# Patient Record
Sex: FEMALE | Race: WHITE | HISPANIC OR LATINO | Employment: UNEMPLOYED | URBAN - METROPOLITAN AREA
[De-identification: names, ages, dates, MRNs, and addresses within clinical notes are randomized per-mention and may not be internally consistent; named-entity substitution may affect disease eponyms.]

---

## 2018-01-11 NOTE — PROGRESS NOTES
SEP 6 2016         RE: Carmen Rockwell                               To: Bong Mcgarry LORENA NATA    MR#: [id number]   : [address]: [id number]                             Fax: [phone]   (Exam #: [id number])      The LMP of this 32year old,  G1, P0-0-0-0 patient was DEC 24 2015, giving   her an TAE of SEP 29 2016 and a current gestational age of 42 weeks 5 days   by dates  A sonographic examination was performed on SEP 6 2016 using real   time equipment  The ultrasound examination was performed using abdominal   technique  The patient has a BMI of 22 3  Her blood pressure today was   99/55  Earliest ultrasound found in her record: [facility] 16 35w2d 10/9/16 TAE      Thank you very much for your kind referral of Carmen Rockwell to the   [facility]  in [place] on 2016 for ultrasound   evaluation and  consult  Rebecca Mcdaniel is a 26-year-old primigravida   who is currently at 36-5/7 weeks gestation by an estimated due date of   2016  She recently moved to the [place] from [facility]  I offered Rebecca Mcdaniel the services of the telephone  service for   translation from [facility], which she declined  Instead, she preferred to   have a family member, her mother-in-law, provides translation  Rebecca Mcdaniel has   no physical complaints today  She reports regular fetal movement and   denies problems related to vaginal bleeding,  labor, or   hypertension  A review of medical records reveals that she had an elevated   screening one-hour Glucola value of 163 mg/dL on 2016  A   diagnostic three-hour glucose tolerance test performed on 2016   revealed 2 out of 4 abnormal values, consistent with a diagnosis of   gestational diabetes  Rebecca Mcdaniel has unremarkable past medical and surgical histories  She   currently takes no medication with the exception of a prenatal vitamin on   a daily basis   She denies tobacco, alcohol, or illicit drug use during the   pregnancy  She has no known drug allergy  The family genetic history is   negative with respect to genetic abnormalities, birth defects, or mental   retardation  Her dad has diabetes  The family medical history is otherwise   non contributory  Cardiac motion was observed at 138 bpm       INDICATIONS      late transfer   fetal anatomical survey      Exam Types      LEVEL II   Doppler study   NST      RESULTS      Fetus # 1 of 1   Vertex presentation   Probable symmetric IUGR   Placenta Location = Anterior   No placenta previa   Placenta Grade = II      The NST was reactive with no decelerations  MEASUREMENTS (* Included In Average GA)      AC              30 0 cm        34 weeks 1 day * (9%)   BPD              9 2 cm        37 weeks 4 days* (70%)   HC              32 3 cm        36 weeks 0 days* (31%)   Femur            6 6 cm        33 weeks 4 days* (7%)      Humerus          5 6 cm        32 weeks 2 days  (<5%)   Radius           4 7 cm        34 weeks 0 days   Ulna             5 3 cm        33 weeks 4 days   Tibia            5 8 cm        33 weeks 6 days  (16%)   Fibula           5 7 cm        32 weeks 6 days      Cerebellum       4 8 cm        36 weeks 1 day   Biorbit          5 2 cm        32 weeks 5 days      HC/AC           1 08   FL/AC           0 22   FL/BPD          0 71   EFW (Ac/Fl/Hc)  2404 grams - 5 lbs 5 oz                 (7%)      THE AVERAGE GESTATIONAL AGE is 35 weeks 2 days +/- 21 days        AMNIOTIC FLUID      Q1: 5 4      Q2: 3 6      Q3: 4 8      Q4: 3 1   FRANCINE Total = 16 9 cm   Amniotic Fluid: Normal      FETAL VESSELS                                     S/D   PI    RI    PSV   AEDV RF                                                    cm/s       Umbilical Artery                 0 74              No   No       Middle Cerebral Artery           1 34      ANATOMY      Head                                    See Details   Face/Neck                               See Details   Th  Cav  Normal   Heart                                   Normal   Abd  Cav  Normal   Stomach                                 Normal   Right Kidney                            Normal   Left Kidney                             Normal   Bladder                                 Normal   Abd  Wall                               Normal   Spine                                   Not Visualized   Extrems                                 See Details   Placenta                                Normal   Umbl  Cord                              Normal   Uterus                                  Normal   PCI                                     Not Visualized      ANATOMY DETAILS      Visualized Appearing Sonographically Normal:   HEAD: (Calvarium, BPD Level, Lateral Ventricles, Choroid Plexus,   Cerebellum);    TH  CAV : (Diaphragm); HEART: (Four Chamber View,   Proximal Left Outflow, Proximal Right Outflow, 3 Vessel Trachea, Short   Axis of Greater Vessels, Ductal Arch, Aortic Arch, Interventricular   Septum, Interatrial Septum, Cardiac Axis, Cardiac Position);    ABD  CAV ,   STOMACH, RIGHT KIDNEY, LEFT KIDNEY, BLADDER, ABD  WALL, EXTREMS: (Lt   Humerus, Rt Humerus, Lt Forearm, Rt Forearm, Lt Femur, Rt Femur); PLACENTA, UMBL  CORD, UTERUS      Not Visualized:   HEAD: (Cavum, Cisterna Magna);    FACE/NECK: (Neck, Profile, Orbits,   Nose/Lips, Palate, Face);    SPINE, EXTREMS: (Lt Hand, Rt Hand, Lt Low   Leg, Rt Low Leg, Lt Foot, Rt Foot); PCI      ADNEXA      The left ovary appeared normal and measured 4 7 x 3 3 x 1 4 cm with a   volume of 11 4 cc  The right ovary appeared normal and measured 2 9 x 1 3   x 1 3 cm with a volume of 2 6 cc        IMPRESSION      [facility] IUP   35 weeks and 2 days by this ultrasound  (TAE=OCT 9 2016)   Vertex presentation   Growth assessment indicated probable symmetric IUGR   Regular fetal heart rate of 138 bpm   Anterior placenta   No placenta previa      CONSULT COMMENT      No fetal structural abnormality or ultrasound marker for aneuploidy is   identified on the Level II ultrasound study today in a difficult and   limited study secondary to constraints related to the late gestational age   and unfavorable fetal position  In particular, suboptimal imaging of the    cisterna magna, given septum pellucidum, face, spine, placental cord   insertion site, and distal extremities is afforded  Evaluation of biometry   reveals suspicion of IUGR  The estimated fetal weight is placed at the 7th   percentile for this gestational age  The amniotic fluid volume is normal    The fetal Doppler studies are normal  The cerebroplacental ratio is   normal   The placenta is normal in appearance  Today's ultrasound findings were discussed in detail with Maricarmen  Prenatal ultrasound cannot rule out all congenital abnormalities  Suggested   follow-up fetal surveillance includes continuation of twice per week   NST's, weekly FRANCINE's, and daily kick counts the indication of IUGR  Weekly   Doppler studies will be performed  Fetal interval growth will be   reassessed in 2 weeks  Lewis Cook should be entered into the Diabetes in   Pregnancy program in the [facility]  given her recent glucose   tolerance test results  A 75 g, two-hour, oral glucose tolerance test is   recommended 6-12 weeks after delivery  [facility] is a country which has   been identified to have active Zika virus transmission  Lewis Cook should   have laboratory studies performed soon  Asymptomatic pregnant women with   exposure to [place] may be offered screening with serologic testing within   2-12 weeks after the last date of possible exposure   Asymptomatic women   who live in areas with active Zika virus transmission should have Zika   virus IgM testing, with immediate rRT-PCR testing of women who are   IgM-positive; a positive rRT-PCR test provides a definitive diagnosis of   [place] virus infection  These studies were not ordered for Maricaremn at her   visit today  The face to face time, in addition to time spent discussing ultrasound   results, was 15 minutes, greater than 50% of which was spent during   counseling and coordination of care  OLIVIA Walden M D     Maternal-Fetal Medicine   Electronically signed 09/07/16 10:48

## 2018-05-11 ENCOUNTER — OFFICE VISIT (OUTPATIENT)
Dept: OBGYN CLINIC | Facility: CLINIC | Age: 33
End: 2018-05-11
Payer: COMMERCIAL

## 2018-05-11 VITALS
BODY MASS INDEX: 21.09 KG/M2 | SYSTOLIC BLOOD PRESSURE: 108 MMHG | HEIGHT: 63 IN | DIASTOLIC BLOOD PRESSURE: 62 MMHG | WEIGHT: 119 LBS

## 2018-05-11 DIAGNOSIS — Z01.419 ENCNTR FOR GYN EXAM (GENERAL) (ROUTINE) W/O ABN FINDINGS: Primary | ICD-10-CM

## 2018-05-11 PROBLEM — O42.00 PREMATURE RUPTURE OF MEMBRANES WITH ONSET OF LABOR WITHIN 24 HOURS OF RUPTURE: Status: RESOLVED | Noted: 2018-05-11 | Resolved: 2018-05-11

## 2018-05-11 PROCEDURE — S0610 ANNUAL GYNECOLOGICAL EXAMINA: HCPCS | Performed by: PHYSICIAN ASSISTANT

## 2018-05-11 PROCEDURE — G0145 SCR C/V CYTO,THINLAYER,RESCR: HCPCS | Performed by: PHYSICIAN ASSISTANT

## 2018-05-11 PROCEDURE — 87624 HPV HI-RISK TYP POOLED RSLT: CPT | Performed by: PHYSICIAN ASSISTANT

## 2018-05-11 NOTE — PROGRESS NOTES
[place]  1985      CC:  Yearly exam    S:  28 y o  female here for yearly exam  Her cycles are regular, not heavy or crampy  She is sexually active  She uses a low dose pill that she got from [facility] for contraception  She would like a new pill  Last Pap 2017 neg (in [facility])    Current Outpatient Prescriptions:     Norethin-Eth Estrad-Fe Biphas 1 MG-10 MCG / 10 MCG TABS, Take 1 tablet by mouth daily, Disp: 90 tablet, Rfl: 0  Social History     Social History    Marital status: /Civil Union     Spouse name: N/A    Number of children: N/A    Years of education: N/A     Occupational History    Not on file  Social History Main Topics    Smoking status: Former Smoker    Smokeless tobacco: Never Used    Alcohol use Yes      Comment: socially     Drug use: No    Sexual activity: Yes     Partners: Male     Birth control/ protection: None     Other Topics Concern    Not on file     Social History Narrative    No narrative on file     Family History   Problem Relation Age of Onset    Heart failure Mother     Diabetes Father       Past Medical History:   Diagnosis Date    H/O abdominoplasty     H/O breast implant     Migraine     Premature rupture of membranes with onset of labor within 24 hours of rupture         O:  Blood pressure 108/62, height 5' 2 99" (1 6 m), weight 54 kg (119 lb), last menstrual period 05/04/2018, currently breastfeeding  Patient appears well and is not in distress  Neck is supple without masses  Breasts are symmetrical without mass, tenderness, nipple discharge, skin changes or adenopathy  Abdomen is soft and nontender without masses  External genitals are normal without lesions or rashes  Vagina is normal without discharge or bleeding  Cervix is normal without discharge or lesion  Uterus is normal, mobile, nontender without palpable mass  Adnexa are normal, nontender, without palpable mass       A:  Yearly exam      P:   Pap and HPV today   Lo Loestrin x 3 cycles, call in 3 months with status    report for refill or change in pill   RTO one year for yearly exam or sooner as needed

## 2018-05-15 LAB — HPV RRNA GENITAL QL NAA+PROBE: NORMAL

## 2018-05-16 LAB
LAB AP GYN PRIMARY INTERPRETATION: NORMAL
Lab: NORMAL

## 2018-06-07 ENCOUNTER — OFFICE VISIT (OUTPATIENT)
Dept: FAMILY MEDICINE CLINIC | Facility: CLINIC | Age: 33
End: 2018-06-07
Payer: COMMERCIAL

## 2018-06-07 VITALS
HEIGHT: 62 IN | RESPIRATION RATE: 16 BRPM | WEIGHT: 122 LBS | OXYGEN SATURATION: 98 % | DIASTOLIC BLOOD PRESSURE: 70 MMHG | BODY MASS INDEX: 22.45 KG/M2 | TEMPERATURE: 98 F | SYSTOLIC BLOOD PRESSURE: 108 MMHG | HEART RATE: 77 BPM

## 2018-06-07 DIAGNOSIS — G43.009 MIGRAINE WITHOUT AURA AND WITHOUT STATUS MIGRAINOSUS, NOT INTRACTABLE: ICD-10-CM

## 2018-06-07 DIAGNOSIS — O24.419 GESTATIONAL DIABETES MELLITUS (GDM) IN THIRD TRIMESTER, GESTATIONAL DIABETES METHOD OF CONTROL UNSPECIFIED: Primary | ICD-10-CM

## 2018-06-07 DIAGNOSIS — O36.5930 INTRAUTERINE GROWTH RESTRICTION (IUGR) AFFECTING CARE OF MOTHER, THIRD TRIMESTER, SINGLE GESTATION: ICD-10-CM

## 2018-06-07 PROCEDURE — 99204 OFFICE O/P NEW MOD 45 MIN: CPT | Performed by: FAMILY MEDICINE

## 2018-06-07 RX ORDER — RIZATRIPTAN BENZOATE 5 MG/1
5 TABLET, ORALLY DISINTEGRATING ORAL ONCE AS NEEDED
Qty: 9 TABLET | Refills: 0 | Status: SHIPPED | OUTPATIENT
Start: 2018-06-07 | End: 2018-07-10 | Stop reason: SDUPTHER

## 2018-06-07 NOTE — PROGRESS NOTES
Assessment/Plan:    No problem-specific Assessment & Plan notes found for this encounter  Diagnoses and all orders for this visit:    Gestational diabetes mellitus (GDM) in third trimester, gestational diabetes method of control unspecified  -     HEMOGLOBIN A1C W/ EAG ESTIMATION; Future  -     Comprehensive metabolic panel; Future  -     CBC and differential; Future  -     Lipid Panel with Direct LDL reflex; Future  -     Vitamin D 25 hydroxy; Future  -     Vitamin B12; Future    Intrauterine growth restriction (IUGR) affecting care of mother, third trimester, single gestation  -     HEMOGLOBIN A1C W/ EAG ESTIMATION; Future  -     Comprehensive metabolic panel; Future  -     CBC and differential; Future  -     Lipid Panel with Direct LDL reflex; Future  -     Vitamin D 25 hydroxy; Future  -     Vitamin B12; Future    Migraine without aura and without status migrainosus, not intractable  -     HEMOGLOBIN A1C W/ EAG ESTIMATION; Future  -     Comprehensive metabolic panel; Future  -     CBC and differential; Future  -     Lipid Panel with Direct LDL reflex; Future  -     Vitamin D 25 hydroxy; Future  -     Vitamin B12; Future  -     rizatriptan (MAXALT-MLT) 5 MG disintegrating tablet; Take 1 tablet (5 mg total) by mouth once as needed for migraine for up to 1 dose May repeat in 2 hours if needed        Discuss with your OB change in contraception method  She is not interested in getting pregnant in >3 years  Discussed proper use of med  Discussed avoidance of triggers  Good hydration  FU in 1 month with labs    Subjective:   Pt her for an initial visit  Complaining of frequent headaches for about a month     Patient ID: Andrew Guzman is a 28 y o  female  HPI  Pt presents for Headaches for about a month      She states she has hx of chronic headaches, back in [facility] 2 years ago she was treated with medicines daily for her migraines and was controlled, then 2 years ago she moved to this area and had stopped treatment  She then was pregnant in 2016 with her first child  No many issues until a month ago started with once- 2 times a week headaches on the neck and occipital area with nausea and photophobia  Triggers- strong smells or perfumes  She recently switch her OCP  Menses  LMP started 3 days ago    Last headache 3 days ago  Denies sxs of allergies or sinus pressure  No visual changes  Take ibuprofen and tylenol with only ocational relief  The following portions of the patient's history were reviewed and updated as appropriate: allergies, current medications, past family history, past medical history, past social history, past surgical history and problem list     Review of Systems   Constitutional: Negative for activity change and appetite change  Respiratory: Negative  Cardiovascular: Negative  Gastrointestinal: Negative  Genitourinary: Negative  Musculoskeletal: Negative for arthralgias  Skin: Negative for rash  Psychiatric/Behavioral: Negative  Objective:      /70   Pulse 77   Temp 98 °F (36 7 °C)   Resp 16   Ht 5' 1 61" (1 565 m)   Wt 55 3 kg (122 lb)   SpO2 98%   BMI 22 59 kg/m²          Physical Exam   Constitutional: She is oriented to person, place, and time  She appears well-developed and well-nourished  HENT:   Head: Normocephalic  Right Ear: External ear normal    Left Ear: External ear normal    Eyes: Conjunctivae and EOM are normal    Neck: Normal range of motion  No thyromegaly present  Cardiovascular: Normal rate, regular rhythm and normal heart sounds  Pulmonary/Chest: Effort normal and breath sounds normal  No respiratory distress  She has no wheezes  She has no rales  Neurological: She is alert and oriented to person, place, and time  No cranial nerve deficit  Psychiatric: She has a normal mood and affect   Her behavior is normal

## 2018-06-11 ENCOUNTER — APPOINTMENT (OUTPATIENT)
Dept: LAB | Facility: CLINIC | Age: 33
End: 2018-06-11
Payer: COMMERCIAL

## 2018-06-11 ENCOUNTER — TRANSCRIBE ORDERS (OUTPATIENT)
Dept: LAB | Facility: CLINIC | Age: 33
End: 2018-06-11

## 2018-06-11 DIAGNOSIS — G43.009 MIGRAINE WITHOUT AURA AND WITHOUT STATUS MIGRAINOSUS, NOT INTRACTABLE: ICD-10-CM

## 2018-06-11 DIAGNOSIS — O24.419 GESTATIONAL DIABETES MELLITUS (GDM) IN THIRD TRIMESTER, GESTATIONAL DIABETES METHOD OF CONTROL UNSPECIFIED: ICD-10-CM

## 2018-06-11 DIAGNOSIS — O36.5930 INTRAUTERINE GROWTH RESTRICTION (IUGR) AFFECTING CARE OF MOTHER, THIRD TRIMESTER, SINGLE GESTATION: ICD-10-CM

## 2018-06-11 LAB
25(OH)D3 SERPL-MCNC: 38.1 NG/ML (ref 30–100)
ALBUMIN SERPL BCP-MCNC: 3.6 G/DL (ref 3.5–5)
ALP SERPL-CCNC: 39 U/L (ref 46–116)
ALT SERPL W P-5'-P-CCNC: 24 U/L (ref 12–78)
ANION GAP SERPL CALCULATED.3IONS-SCNC: 8 MMOL/L (ref 4–13)
AST SERPL W P-5'-P-CCNC: 15 U/L (ref 5–45)
BASOPHILS # BLD AUTO: 0.02 THOUSANDS/ΜL (ref 0–0.1)
BASOPHILS NFR BLD AUTO: 0 % (ref 0–1)
BILIRUB SERPL-MCNC: 0.7 MG/DL (ref 0.2–1)
BUN SERPL-MCNC: 12 MG/DL (ref 5–25)
CALCIUM SERPL-MCNC: 8.7 MG/DL (ref 8.3–10.1)
CHLORIDE SERPL-SCNC: 106 MMOL/L (ref 100–108)
CHOLEST SERPL-MCNC: 120 MG/DL (ref 50–200)
CO2 SERPL-SCNC: 26 MMOL/L (ref 21–32)
CREAT SERPL-MCNC: 0.75 MG/DL (ref 0.6–1.3)
EOSINOPHIL # BLD AUTO: 0.08 THOUSAND/ΜL (ref 0–0.61)
EOSINOPHIL NFR BLD AUTO: 1 % (ref 0–6)
ERYTHROCYTE [DISTWIDTH] IN BLOOD BY AUTOMATED COUNT: 13.4 % (ref 11.6–15.1)
EST. AVERAGE GLUCOSE BLD GHB EST-MCNC: 108 MG/DL
GFR SERPL CREATININE-BSD FRML MDRD: 106 ML/MIN/1.73SQ M
GLUCOSE P FAST SERPL-MCNC: 86 MG/DL (ref 65–99)
HBA1C MFR BLD: 5.4 % (ref 4.2–6.3)
HCT VFR BLD AUTO: 40.3 % (ref 34.8–46.1)
HDLC SERPL-MCNC: 46 MG/DL (ref 40–60)
HGB BLD-MCNC: 13.5 G/DL (ref 11.5–15.4)
LDLC SERPL CALC-MCNC: 55 MG/DL (ref 0–100)
LYMPHOCYTES # BLD AUTO: 2.75 THOUSANDS/ΜL (ref 0.6–4.47)
LYMPHOCYTES NFR BLD AUTO: 47 % (ref 14–44)
MCH RBC QN AUTO: 29.5 PG (ref 26.8–34.3)
MCHC RBC AUTO-ENTMCNC: 33.5 G/DL (ref 31.4–37.4)
MCV RBC AUTO: 88 FL (ref 82–98)
MONOCYTES # BLD AUTO: 0.27 THOUSAND/ΜL (ref 0.17–1.22)
MONOCYTES NFR BLD AUTO: 5 % (ref 4–12)
NEUTROPHILS # BLD AUTO: 2.78 THOUSANDS/ΜL (ref 1.85–7.62)
NEUTS SEG NFR BLD AUTO: 47 % (ref 43–75)
PLATELET # BLD AUTO: 291 THOUSANDS/UL (ref 149–390)
PMV BLD AUTO: 9.6 FL (ref 8.9–12.7)
POTASSIUM SERPL-SCNC: 4.5 MMOL/L (ref 3.5–5.3)
PROT SERPL-MCNC: 7 G/DL (ref 6.4–8.2)
RBC # BLD AUTO: 4.58 MILLION/UL (ref 3.81–5.12)
SODIUM SERPL-SCNC: 140 MMOL/L (ref 136–145)
TRIGL SERPL-MCNC: 94 MG/DL
VIT B12 SERPL-MCNC: 355 PG/ML (ref 100–900)
WBC # BLD AUTO: 5.9 THOUSAND/UL (ref 4.31–10.16)

## 2018-06-11 PROCEDURE — 85025 COMPLETE CBC W/AUTO DIFF WBC: CPT

## 2018-06-11 PROCEDURE — 80053 COMPREHEN METABOLIC PANEL: CPT

## 2018-06-11 PROCEDURE — 83036 HEMOGLOBIN GLYCOSYLATED A1C: CPT

## 2018-06-11 PROCEDURE — 36415 COLL VENOUS BLD VENIPUNCTURE: CPT

## 2018-06-11 PROCEDURE — 80061 LIPID PANEL: CPT

## 2018-06-11 PROCEDURE — 82306 VITAMIN D 25 HYDROXY: CPT

## 2018-06-11 PROCEDURE — 82607 VITAMIN B-12: CPT

## 2018-07-09 NOTE — PROGRESS NOTES
Assessment/Plan:    No problem-specific Assessment & Plan notes found for this encounter  Diagnoses and all orders for this visit:    Migraine without aura and without status migrainosus, not intractable  -     rizatriptan (MAXALT-MLT) 5 MG disintegrating tablet; Take 1 tablet (5 mg total) by mouth once as needed for migraine for up to 1 dose May repeat in 2 hours if needed        Stable, doing well  HM in 1 year  PRN if any increase episode of migraines/headaches  Subjective:   Pt here for 1 month follow up   Labs done 6/11/18  No new issues to discuss today     Patient ID: Srinivas Torres is a 35 y o  female  HPI   patient presents for one-month follow-up her headaches and labs  She is doing really well with her headaches  She has taken the Maxalt about 4-5 times/episodes with good resolution of her symptoms  We have reviewed and discussed her labs and all within normal limits  The following portions of the patient's history were reviewed and updated as appropriate: allergies, current medications, past family history, past medical history, past social history, past surgical history and problem list     Review of Systems   Constitutional: Negative for activity change and appetite change  Respiratory: Negative  Cardiovascular: Negative  Gastrointestinal: Negative  Genitourinary: Negative  Musculoskeletal: Negative for arthralgias  Skin: Negative for rash  Psychiatric/Behavioral: Negative  Objective:       BP 98/62   Pulse 64   Temp 98 2 °F (36 8 °C)   Ht 5' 1 42" (1 56 m)   Wt 55 3 kg (122 lb)   SpO2 99%   BMI 22 74 kg/m²          Physical Exam   Constitutional: She is oriented to person, place, and time  She appears well-developed and well-nourished  No distress  HENT:   Head: Normocephalic     Right Ear: External ear normal    Left Ear: External ear normal    Eyes: Conjunctivae are normal    Cardiovascular: Normal rate, regular rhythm and normal heart sounds  Pulmonary/Chest: Effort normal and breath sounds normal  No respiratory distress  She has no wheezes  She has no rales  Abdominal: Soft  Bowel sounds are normal  There is no tenderness  Musculoskeletal: Normal range of motion  She exhibits no edema  Neurological: She is alert and oriented to person, place, and time  No cranial nerve deficit  Psychiatric: She has a normal mood and affect   Her behavior is normal

## 2018-07-10 ENCOUNTER — OFFICE VISIT (OUTPATIENT)
Dept: FAMILY MEDICINE CLINIC | Facility: CLINIC | Age: 33
End: 2018-07-10
Payer: COMMERCIAL

## 2018-07-10 VITALS
SYSTOLIC BLOOD PRESSURE: 98 MMHG | WEIGHT: 122 LBS | HEART RATE: 64 BPM | BODY MASS INDEX: 23.03 KG/M2 | DIASTOLIC BLOOD PRESSURE: 62 MMHG | HEIGHT: 61 IN | TEMPERATURE: 98.2 F | OXYGEN SATURATION: 99 %

## 2018-07-10 DIAGNOSIS — G43.009 MIGRAINE WITHOUT AURA AND WITHOUT STATUS MIGRAINOSUS, NOT INTRACTABLE: ICD-10-CM

## 2018-07-10 PROCEDURE — 99213 OFFICE O/P EST LOW 20 MIN: CPT | Performed by: FAMILY MEDICINE

## 2018-07-10 PROCEDURE — 3008F BODY MASS INDEX DOCD: CPT | Performed by: FAMILY MEDICINE

## 2018-07-10 RX ORDER — RIZATRIPTAN BENZOATE 5 MG/1
5 TABLET, ORALLY DISINTEGRATING ORAL ONCE AS NEEDED
Qty: 30 TABLET | Refills: 1 | Status: SHIPPED | OUTPATIENT
Start: 2018-07-10 | End: 2019-06-18 | Stop reason: ALTCHOICE

## 2018-08-06 DIAGNOSIS — Z30.41 ENCOUNTER FOR SURVEILLANCE OF CONTRACEPTIVE PILLS: Primary | ICD-10-CM

## 2018-08-06 NOTE — TELEPHONE ENCOUNTER
Spoke with pt - she was seen in May - was put on Lo Loestrin  Has not missed any pills  Since starting the OCP she has been having light spotting that would come and go  No HPT done  No back up birth control used during the first month on the OCP  Patient is worried  Please advise  Thanks!

## 2018-08-06 NOTE — TELEPHONE ENCOUNTER
w87 is prov,  pt was seen in May for 1st time,  having issues with her [facility] lo loestrin fe, she is spotting non stop since May,   Her breasts are very tender,  On & off headaches,  pls ask & speak to  Yumiko Gore,   Pt speaks Gambian only,  thanks

## 2018-08-08 NOTE — TELEPHONE ENCOUNTER
Patient  returned call - advised him that pt needs to do an HPT - if negative, we will then change her OCP  He will call bck with the results

## 2018-08-10 RX ORDER — NORGESTREL-ETHINYL ESTRADIOL 0.3-0.03MG
1 TABLET ORAL DAILY
Qty: 28 TABLET | Refills: 10 | Status: SHIPPED | OUTPATIENT
Start: 2018-08-10 | End: 2019-07-07 | Stop reason: SDUPTHER

## 2018-09-19 ENCOUNTER — TELEPHONE (OUTPATIENT)
Dept: FAMILY MEDICINE CLINIC | Facility: CLINIC | Age: 33
End: 2018-09-19

## 2018-09-19 NOTE — TELEPHONE ENCOUNTER
This patient's  keeps calling looking for her  permit paperwork that was dropped off more than 10 days ago  Can you please try to track it down in YI9's paperwork please and give it to me      Thanks

## 2019-01-27 ENCOUNTER — OFFICE VISIT (OUTPATIENT)
Dept: URGENT CARE | Facility: MEDICAL CENTER | Age: 34
End: 2019-01-27
Payer: COMMERCIAL

## 2019-01-27 VITALS
BODY MASS INDEX: 23.18 KG/M2 | OXYGEN SATURATION: 100 % | HEART RATE: 72 BPM | WEIGHT: 122.8 LBS | SYSTOLIC BLOOD PRESSURE: 108 MMHG | DIASTOLIC BLOOD PRESSURE: 66 MMHG | RESPIRATION RATE: 18 BRPM | HEIGHT: 61 IN | TEMPERATURE: 98.5 F

## 2019-01-27 DIAGNOSIS — R10.9 ABDOMINAL PAIN, UNSPECIFIED ABDOMINAL LOCATION: Primary | ICD-10-CM

## 2019-01-27 PROCEDURE — 99213 OFFICE O/P EST LOW 20 MIN: CPT | Performed by: PHYSICIAN ASSISTANT

## 2019-01-27 RX ORDER — NAPROXEN 500 MG/1
500 TABLET ORAL 2 TIMES DAILY WITH MEALS
Qty: 20 TABLET | Refills: 0 | Status: SHIPPED | OUTPATIENT
Start: 2019-01-27 | End: 2019-06-18 | Stop reason: ALTCHOICE

## 2019-01-27 NOTE — PATIENT INSTRUCTIONS
Abdominal pain  Naprosyn twice daily x 10 days  Follow up with PCP in 3-5 days  Proceed to  ER if symptoms worsenAbdominal Pain   WHAT YOU NEED TO KNOW:   Abdominal pain can be dull, achy, or sharp  You may have pain in one area of your abdomen, or in your entire abdomen  Your pain may be caused by a condition such as constipation, food sensitivity or poisoning, infection, or a blockage  Abdominal pain can also be from a hernia, appendicitis, or an ulcer  Liver, gallbladder, or kidney conditions can also cause abdominal pain  The cause of your abdominal pain may be unknown  DISCHARGE INSTRUCTIONS:   Return to the emergency department if:   · You have new chest pain or shortness of breath  · You have pulsing pain in your upper abdomen or lower back that suddenly becomes constant  · Your pain is in the right lower abdominal area and worsens with movement  · You have a fever over 100 4°F (38°C) or shaking chills  · You are vomiting and cannot keep food or liquids down  · Your pain does not improve or gets worse over the next 8 to 12 hours  · You see blood in your vomit or bowel movements, or they look black and tarry  · Your skin or the whites of your eyes turn yellow  · You are a woman and have a large amount of vaginal bleeding that is not your monthly period  Contact your healthcare provider if:   · You have pain in your lower back  · You are a man and have pain in your testicles  · You have pain when you urinate  · You have questions or concerns about your condition or care  Follow up with your healthcare provider within 24 hours or as directed:  Write down your questions so you remember to ask them during your visits  Medicines:   · Medicines  may be given to calm your stomach and prevent vomiting or to decrease pain  Ask how to take pain medicine safely  · Take your medicine as directed    Contact your healthcare provider if you think your medicine is not helping or if you have side effects  Tell him of her if you are allergic to any medicine  Keep a list of the medicines, vitamins, and herbs you take  Include the amounts, and when and why you take them  Bring the list or the pill bottles to follow-up visits  Carry your medicine list with you in case of an emergency  © 2017 [address] [employer] Information is for End User's use only and may not be sold, redistributed or otherwise used for commercial purposes  All illustrations and images included in CareNotes® are the copyrighted property of A [employer] M [employer] Inc  or [employer]  The above information is an  only  It is not intended as medical advice for individual conditions or treatments  Talk to your doctor, nurse or pharmacist before following any medical regimen to see if it is safe and effective for you

## 2019-01-27 NOTE — PROGRESS NOTES
[facility]        NAME: Amada Conte is a 35 y o  female  : 1985    MRN: [id number]  DATE: 2019  TIME: 12:08 PM    Assessment and Plan   Abdominal pain, unspecified abdominal location [R10 9]  1  Abdominal pain, unspecified abdominal location  naproxen (NAPROSYN) 500 mg tablet         Patient Instructions     Abdominal pain  Naprosyn twice daily x 10 days  Follow up with PCP in 3-5 days  Proceed to  ER if symptoms worsen  Chief Complaint     Chief Complaint   Patient presents with    Abdominal Pain     Pt  C/O lower abdominal pain since yesterday when she got her period  History of Present Illness       34 y/o female c/o  Pelvic pain on/off x 6 months  Patient states she uses BCP and every time she stops taking the medicine she gets pain  Denies vaginal discharge, n/v, fever, chills        Review of Systems   Review of Systems   Constitutional: Negative  HENT: Negative  Eyes: Negative  Respiratory: Negative  Negative for cough, chest tightness, shortness of breath, wheezing and stridor  Cardiovascular: Negative  Negative for chest pain, palpitations and leg swelling  Gastrointestinal: Positive for abdominal pain  Negative for abdominal distention, anal bleeding, blood in stool, constipation, diarrhea, nausea, rectal pain and vomiting           Current Medications       Current Outpatient Prescriptions:     CRYSELLE-28 0 3-30 MG-MCG per tablet, Take 1 tablet by mouth daily (Patient not taking: Reported on 2019 ), Disp: 28 tablet, Rfl: 10    naproxen (NAPROSYN) 500 mg tablet, Take 1 tablet (500 mg total) by mouth 2 (two) times a day with meals for 10 days, Disp: 20 tablet, Rfl: 0    Norethin-Eth Estrad-Fe Biphas 1 MG-10 MCG / 10 MCG TABS, Take 1 tablet by mouth daily (Patient not taking: Reported on 2019 ), Disp: 90 tablet, Rfl: 0    rizatriptan (MAXALT-MLT) 5 MG disintegrating tablet, Take 1 tablet (5 mg total) by mouth once as needed for migraine for up to 1 dose May repeat in 2 hours if needed (Patient not taking: Reported on 2019 ), Disp: 30 tablet, Rfl: 1    Current Allergies     Allergies as of 2019    (No Known Allergies)            The following portions of the patient's history were reviewed and updated as appropriate: allergies, current medications, past family history, past medical history, past social history, past surgical history and problem list      Past Medical History:   Diagnosis Date    Chicken pox     H/O abdominoplasty     H/O breast implant     Headache     Hepatitis     Migraine     Premature rupture of membranes with onset of labor within 24 hours of rupture        Past Surgical History:   Procedure Laterality Date    BREAST IMPLANT       SECTION      CHOLECYSTECTOMY      [place]  DELIVERY ONLY N/A 9/10/2016    Procedure:  SECTION (); Surgeon: Liliana Baugh MD;  Location: [facility] ;  Service: Obstetrics       Family History   Problem Relation Age of Onset    Heart failure Mother     Diabetes Father          Medications have been verified  Objective   /66   Pulse 72   Temp 98 5 °F (36 9 °C) (Temporal)   Resp 18   Ht 5' 1" (1 549 m)   Wt 55 7 kg (122 lb 12 8 oz)   SpO2 100%   BMI 23 20 kg/m²        Physical Exam     Physical Exam   Constitutional: She appears well-developed and well-nourished  HENT:   Head: Normocephalic and atraumatic  Right Ear: External ear normal    Left Ear: External ear normal    Nose: Nose normal    Mouth/Throat: Oropharynx is clear and moist  No oropharyngeal exudate  Neck: Normal range of motion  Neck supple  Cardiovascular: Normal rate, regular rhythm, normal heart sounds and intact distal pulses  Pulmonary/Chest: Effort normal and breath sounds normal  No respiratory distress  She has no wheezes  She has no rales  She exhibits no tenderness  Abdominal: Soft   Bowel sounds are normal  She exhibits no distension and no mass  There is tenderness in the suprapubic area  There is no rigidity, no rebound, no guarding, no CVA tenderness, no tenderness at McBurney's point and negative Patel's sign  Lymphadenopathy:     She has no cervical adenopathy

## 2019-06-18 ENCOUNTER — OFFICE VISIT (OUTPATIENT)
Dept: OBGYN CLINIC | Facility: CLINIC | Age: 34
End: 2019-06-18
Payer: COMMERCIAL

## 2019-06-18 VITALS — DIASTOLIC BLOOD PRESSURE: 62 MMHG | SYSTOLIC BLOOD PRESSURE: 100 MMHG | BODY MASS INDEX: 22.67 KG/M2 | WEIGHT: 120 LBS

## 2019-06-18 DIAGNOSIS — R10.2 PELVIC PAIN: ICD-10-CM

## 2019-06-18 DIAGNOSIS — N93.0 POSTCOITAL BLEEDING: ICD-10-CM

## 2019-06-18 DIAGNOSIS — N93.9 ABNORMAL UTERINE BLEEDING: ICD-10-CM

## 2019-06-18 PROCEDURE — 87624 HPV HI-RISK TYP POOLED RSLT: CPT | Performed by: PHYSICIAN ASSISTANT

## 2019-06-18 PROCEDURE — G0145 SCR C/V CYTO,THINLAYER,RESCR: HCPCS | Performed by: PHYSICIAN ASSISTANT

## 2019-06-18 PROCEDURE — 99214 OFFICE O/P EST MOD 30 MIN: CPT | Performed by: PHYSICIAN ASSISTANT

## 2019-06-18 PROCEDURE — 87591 N.GONORRHOEAE DNA AMP PROB: CPT | Performed by: PHYSICIAN ASSISTANT

## 2019-06-18 PROCEDURE — 87491 CHLMYD TRACH DNA AMP PROBE: CPT | Performed by: PHYSICIAN ASSISTANT

## 2019-06-19 LAB
C TRACH DNA SPEC QL NAA+PROBE: NEGATIVE
N GONORRHOEA DNA SPEC QL NAA+PROBE: NEGATIVE

## 2019-06-21 ENCOUNTER — HOSPITAL ENCOUNTER (OUTPATIENT)
Dept: ULTRASOUND IMAGING | Facility: HOSPITAL | Age: 34
Discharge: HOME | End: 2019-06-21
Payer: COMMERCIAL

## 2019-06-21 DIAGNOSIS — R10.2 PELVIC PAIN: ICD-10-CM

## 2019-06-21 LAB
HPV HR 12 DNA CVX QL NAA+PROBE: NEGATIVE
HPV16 DNA CVX QL NAA+PROBE: NEGATIVE
HPV18 DNA CVX QL NAA+PROBE: NEGATIVE

## 2019-06-21 PROCEDURE — 76856 US EXAM PELVIC COMPLETE: CPT

## 2019-06-21 PROCEDURE — 76830 TRANSVAGINAL US NON-OB: CPT

## 2019-06-24 LAB
BASOPHILS # BLD AUTO: 0 X10E3/UL (ref 0–0.2)
BASOPHILS NFR BLD AUTO: 0 %
EOSINOPHIL # BLD AUTO: 0.1 X10E3/UL (ref 0–0.4)
EOSINOPHIL NFR BLD AUTO: 1 %
ERYTHROCYTE [DISTWIDTH] IN BLOOD BY AUTOMATED COUNT: 13.5 % (ref 12.3–15.4)
HCT VFR BLD AUTO: 41.3 % (ref 34–46.6)
HGB BLD-MCNC: 13.4 G/DL (ref 11.1–15.9)
IMM GRANULOCYTES # BLD: 0 X10E3/UL (ref 0–0.1)
IMM GRANULOCYTES NFR BLD: 0 %
LAB AP GYN PRIMARY INTERPRETATION: NORMAL
LYMPHOCYTES # BLD AUTO: 3.2 X10E3/UL (ref 0.7–3.1)
LYMPHOCYTES NFR BLD AUTO: 40 %
Lab: NORMAL
MCH RBC QN AUTO: 29.5 PG (ref 26.6–33)
MCHC RBC AUTO-ENTMCNC: 32.4 G/DL (ref 31.5–35.7)
MCV RBC AUTO: 91 FL (ref 79–97)
MONOCYTES # BLD AUTO: 0.4 X10E3/UL (ref 0.1–0.9)
MONOCYTES NFR BLD AUTO: 5 %
NEUTROPHILS # BLD AUTO: 4.2 X10E3/UL (ref 1.4–7)
NEUTROPHILS NFR BLD AUTO: 54 %
PLATELET # BLD AUTO: 268 X10E3/UL (ref 150–450)
RBC # BLD AUTO: 4.55 X10E6/UL (ref 3.77–5.28)
TSH SERPL DL<=0.005 MIU/L-ACNC: 3.8 UIU/ML (ref 0.45–4.5)
WBC # BLD AUTO: 7.8 X10E3/UL (ref 3.4–10.8)

## 2019-07-07 DIAGNOSIS — Z30.41 ENCOUNTER FOR SURVEILLANCE OF CONTRACEPTIVE PILLS: ICD-10-CM

## 2019-07-08 RX ORDER — NORGESTREL-ETHINYL ESTRADIOL 0.3-0.03MG
TABLET ORAL
Qty: 28 TABLET | Refills: 10 | Status: SHIPPED | OUTPATIENT
Start: 2019-07-08

## 2019-07-26 ENCOUNTER — PROCEDURE VISIT (OUTPATIENT)
Dept: OBGYN CLINIC | Facility: CLINIC | Age: 34
End: 2019-07-26
Payer: COMMERCIAL

## 2019-07-26 VITALS
RESPIRATION RATE: 14 BRPM | DIASTOLIC BLOOD PRESSURE: 70 MMHG | WEIGHT: 118 LBS | HEIGHT: 63 IN | BODY MASS INDEX: 20.91 KG/M2 | SYSTOLIC BLOOD PRESSURE: 120 MMHG

## 2019-07-26 DIAGNOSIS — N93.9 ABNORMAL UTERINE BLEEDING (AUB): Primary | ICD-10-CM

## 2019-07-26 PROCEDURE — 88305 TISSUE EXAM BY PATHOLOGIST: CPT | Performed by: PATHOLOGY

## 2019-07-26 PROCEDURE — 99213 OFFICE O/P EST LOW 20 MIN: CPT | Performed by: OBSTETRICS & GYNECOLOGY

## 2019-07-26 PROCEDURE — 88342 IMHCHEM/IMCYTCHM 1ST ANTB: CPT | Performed by: PATHOLOGY

## 2019-07-26 PROCEDURE — 57454 BX/CURETT OF CERVIX W/SCOPE: CPT | Performed by: OBSTETRICS & GYNECOLOGY

## 2019-07-26 NOTE — PROGRESS NOTES
Assessment/Plan:      Diagnoses and all orders for this visit:    Abnormal uterine bleeding (AUB)  -     Tissue Exam          Subjective:     Patient ID: Debo Johnson is a 29 y o  female  Patient is a 27-year-old female, para 0 at 24 weeks and 4 days here for a routine prenatal visit with a complaint of vulvar burning  Patient was treated with Flagyl at her last visit for a diagnosis of bacterial vaginosis  Patient states that she has had an improvement but she does not feel normal  Patient states that she has a burning sensation closer to the clitoris  Patient had a history of frequent yeast infections in the past  that she is thinks was secondary to antibiotic use for facial acne  Past medical history significant for eczema  Review of Systems      Objective:     Physical Exam   Genitourinary:               Small shallow ulcer characteristic for of HSV on right Labia  Small area at 6:00 on CVX that turned white with 5% acetic acid

## 2019-07-26 NOTE — PROGRESS NOTES
Procedures    Colposcopy Procedure Note    Indications: Post coital bleeding  Pap smear 1 months ago showed: no abnormalities  The prior pap showed no abnormalities  Prior cervical/vaginal disease: None  Prior cervical treatment: no treatment  Procedure Details   The risks and benefits of the procedure and Verbal informed consent obtained  Speculum placed in vagina and excellent visualization of cervix achieved, cervix swabbed x 3 with acetic acid solution  Findings:  Cervix: no visible lesions; SCJ visualized - lesion at 6:00 o'clock  Vaginal inspection: normal without visible lesions  Vulvar colposcopy: vulvar colposcopy not performed  Specimens: 6:00, EBX, ECC    Complications: none  Plan:  Specimens labelled and sent to Pathology  Will base further treatment on Pathology findings

## 2019-07-29 NOTE — PROGRESS NOTES
Procedures  Endometrial Biopsy Procedure Note    Pre-operative Diagnosis: AUB    Post-operative Diagnosis: same    Indications: abnormal uterine bleeding    Procedure Details    Urine pregnancy test was done today and result was Negative  The risks (including infection, bleeding, pain, and uterine perforation) and benefits of the procedure were explained to the patient and Verbal informed consent was obtained  The patient was placed in the dorsal lithotomy position  Bimanual exam showed the uterus to be in the anteroflexed position  A speculum inserted in the vagina, and the cervix prepped with povidone iodine  A sharp tenaculum was applied to the anterior lip of the cervix for stabilization  The cervix required dilation  A sterile uterine sound was used to sound the uterus to a depth of 8cm  A Pipelle endometrial aspirator was used to sample the endometrium  A(n) moderate amount of tissue was obtained  Sample was sent for pathologic examination  Condition:  Stable    Complications:  None    Plan:    The patient was advised to call for any fever or for prolonged or severe pain or bleeding  She was advised to use OTC analgesics as needed for mild to moderate pain  She was advised to avoid vaginal intercourse for 48 hours or until the bleeding has completely stopped  Attending Physician Documentation:  I was present for or participated in the entire procedure, including opening and closing

## 2020-08-13 ENCOUNTER — TELEPHONE (OUTPATIENT)
Dept: FAMILY MEDICINE CLINIC | Facility: CLINIC | Age: 35
End: 2020-08-13

## 2020-09-24 ENCOUNTER — TELEPHONE (OUTPATIENT)
Dept: FAMILY MEDICINE CLINIC | Facility: CLINIC | Age: 35
End: 2020-09-24

## 2021-04-13 DIAGNOSIS — Z23 ENCOUNTER FOR IMMUNIZATION: ICD-10-CM

## 2021-10-27 PROBLEM — Z00.00 ANNUAL PHYSICAL EXAM: Status: ACTIVE | Noted: 2021-10-27

## 2021-10-28 ENCOUNTER — OFFICE VISIT (OUTPATIENT)
Dept: FAMILY MEDICINE CLINIC | Facility: CLINIC | Age: 36
End: 2021-10-28
Payer: COMMERCIAL

## 2021-10-28 VITALS
HEART RATE: 72 BPM | OXYGEN SATURATION: 98 % | RESPIRATION RATE: 12 BRPM | DIASTOLIC BLOOD PRESSURE: 60 MMHG | SYSTOLIC BLOOD PRESSURE: 104 MMHG | TEMPERATURE: 98.2 F | HEIGHT: 62 IN | WEIGHT: 118 LBS | BODY MASS INDEX: 21.71 KG/M2

## 2021-10-28 DIAGNOSIS — Z23 IMMUNIZATION DUE: ICD-10-CM

## 2021-10-28 DIAGNOSIS — G43.009 MIGRAINE WITHOUT AURA AND WITHOUT STATUS MIGRAINOSUS, NOT INTRACTABLE: ICD-10-CM

## 2021-10-28 DIAGNOSIS — R42 VERTIGO: ICD-10-CM

## 2021-10-28 DIAGNOSIS — Z00.00 ANNUAL PHYSICAL EXAM: Primary | ICD-10-CM

## 2021-10-28 DIAGNOSIS — Z11.59 NEED FOR HEPATITIS C SCREENING TEST: ICD-10-CM

## 2021-10-28 PROCEDURE — 99385 PREV VISIT NEW AGE 18-39: CPT | Performed by: FAMILY MEDICINE

## 2021-10-28 PROCEDURE — 90686 IIV4 VACC NO PRSV 0.5 ML IM: CPT | Performed by: FAMILY MEDICINE

## 2021-10-28 PROCEDURE — 90472 IMMUNIZATION ADMIN EACH ADD: CPT | Performed by: FAMILY MEDICINE

## 2021-10-28 PROCEDURE — 90471 IMMUNIZATION ADMIN: CPT | Performed by: FAMILY MEDICINE

## 2021-10-28 PROCEDURE — 3725F SCREEN DEPRESSION PERFORMED: CPT | Performed by: FAMILY MEDICINE

## 2021-10-28 PROCEDURE — 3008F BODY MASS INDEX DOCD: CPT | Performed by: FAMILY MEDICINE

## 2021-10-28 PROCEDURE — 90715 TDAP VACCINE 7 YRS/> IM: CPT | Performed by: FAMILY MEDICINE

## 2021-10-28 RX ORDER — MECLIZINE HCL 12.5 MG/1
12.5 TABLET ORAL 3 TIMES DAILY PRN
Qty: 30 TABLET | Refills: 3 | Status: SHIPPED | OUTPATIENT
Start: 2021-10-28

## 2021-11-13 ENCOUNTER — APPOINTMENT (OUTPATIENT)
Dept: LAB | Facility: HOSPITAL | Age: 36
End: 2021-11-13
Attending: FAMILY MEDICINE
Payer: COMMERCIAL

## 2021-11-13 DIAGNOSIS — Z11.59 NEED FOR HEPATITIS C SCREENING TEST: ICD-10-CM

## 2021-11-13 DIAGNOSIS — Z00.00 ANNUAL PHYSICAL EXAM: ICD-10-CM

## 2021-11-13 LAB
ALBUMIN SERPL BCP-MCNC: 4.1 G/DL (ref 3.4–4.8)
ALP SERPL-CCNC: 46.4 U/L (ref 35–140)
ALT SERPL W P-5'-P-CCNC: 11 U/L (ref 5–54)
ANION GAP SERPL CALCULATED.3IONS-SCNC: 7 MMOL/L (ref 4–13)
AST SERPL W P-5'-P-CCNC: 16 U/L (ref 15–41)
BASOPHILS # BLD AUTO: 0.02 THOUSANDS/ΜL (ref 0–0.1)
BASOPHILS NFR BLD AUTO: 0 % (ref 0–1)
BILIRUB SERPL-MCNC: 0.64 MG/DL (ref 0.3–1.2)
BUN SERPL-MCNC: 13 MG/DL (ref 6–20)
CALCIUM SERPL-MCNC: 8.9 MG/DL (ref 8.4–10.2)
CHLORIDE SERPL-SCNC: 106 MMOL/L (ref 96–108)
CHOLEST SERPL-MCNC: 136 MG/DL
CO2 SERPL-SCNC: 27 MMOL/L (ref 22–33)
CREAT SERPL-MCNC: 0.68 MG/DL (ref 0.4–1.1)
EOSINOPHIL # BLD AUTO: 0.1 THOUSAND/ΜL (ref 0–0.61)
EOSINOPHIL NFR BLD AUTO: 2 % (ref 0–6)
ERYTHROCYTE [DISTWIDTH] IN BLOOD BY AUTOMATED COUNT: 12.4 % (ref 11.6–15.1)
GFR SERPL CREATININE-BSD FRML MDRD: 113 ML/MIN/1.73SQ M
GLUCOSE P FAST SERPL-MCNC: 89 MG/DL (ref 70–105)
HCT VFR BLD AUTO: 39.8 % (ref 34.8–46.1)
HCV AB SER QL: NORMAL
HDLC SERPL-MCNC: 42 MG/DL
HGB BLD-MCNC: 13.3 G/DL (ref 11.5–15.4)
IMM GRANULOCYTES # BLD AUTO: 0.01 THOUSAND/UL (ref 0–0.2)
IMM GRANULOCYTES NFR BLD AUTO: 0 % (ref 0–2)
LDLC SERPL CALC-MCNC: 84 MG/DL (ref 0–100)
LYMPHOCYTES # BLD AUTO: 2.68 THOUSANDS/ΜL (ref 0.6–4.47)
LYMPHOCYTES NFR BLD AUTO: 50 % (ref 14–44)
MCH RBC QN AUTO: 29 PG (ref 26.8–34.3)
MCHC RBC AUTO-ENTMCNC: 33.4 G/DL (ref 31.4–37.4)
MCV RBC AUTO: 87 FL (ref 82–98)
MONOCYTES # BLD AUTO: 0.41 THOUSAND/ΜL (ref 0.17–1.22)
MONOCYTES NFR BLD AUTO: 8 % (ref 4–12)
NEUTROPHILS # BLD AUTO: 2.15 THOUSANDS/ΜL (ref 1.85–7.62)
NEUTS SEG NFR BLD AUTO: 40 % (ref 43–75)
NONHDLC SERPL-MCNC: 94 MG/DL
PLATELET # BLD AUTO: 285 THOUSANDS/UL (ref 149–390)
PMV BLD AUTO: 9.5 FL (ref 8.9–12.7)
POTASSIUM SERPL-SCNC: 3.6 MMOL/L (ref 3.5–5)
PROT SERPL-MCNC: 6.9 G/DL (ref 6.4–8.3)
RBC # BLD AUTO: 4.58 MILLION/UL (ref 3.81–5.12)
SODIUM SERPL-SCNC: 140 MMOL/L (ref 133–145)
TRIGL SERPL-MCNC: 50.6 MG/DL
WBC # BLD AUTO: 5.37 THOUSAND/UL (ref 4.31–10.16)

## 2021-11-13 PROCEDURE — 86803 HEPATITIS C AB TEST: CPT

## 2021-11-13 PROCEDURE — 36415 COLL VENOUS BLD VENIPUNCTURE: CPT

## 2021-11-13 PROCEDURE — 80053 COMPREHEN METABOLIC PANEL: CPT | Performed by: FAMILY MEDICINE

## 2021-11-13 PROCEDURE — 85025 COMPLETE CBC W/AUTO DIFF WBC: CPT

## 2021-11-13 PROCEDURE — 80061 LIPID PANEL: CPT | Performed by: FAMILY MEDICINE

## 2022-12-13 ENCOUNTER — OFFICE VISIT (OUTPATIENT)
Dept: FAMILY MEDICINE CLINIC | Facility: CLINIC | Age: 37
End: 2022-12-13

## 2022-12-13 VITALS
HEIGHT: 62 IN | OXYGEN SATURATION: 97 % | TEMPERATURE: 97.2 F | SYSTOLIC BLOOD PRESSURE: 118 MMHG | BODY MASS INDEX: 23.37 KG/M2 | WEIGHT: 127 LBS | DIASTOLIC BLOOD PRESSURE: 72 MMHG | HEART RATE: 97 BPM | RESPIRATION RATE: 16 BRPM

## 2022-12-13 DIAGNOSIS — Z00.00 ANNUAL PHYSICAL EXAM: Primary | ICD-10-CM

## 2022-12-13 DIAGNOSIS — R31.29 OTHER MICROSCOPIC HEMATURIA: ICD-10-CM

## 2022-12-13 DIAGNOSIS — Z3A.01 LESS THAN 8 WEEKS GESTATION OF PREGNANCY: ICD-10-CM

## 2022-12-13 DIAGNOSIS — R30.0 DYSURIA: ICD-10-CM

## 2022-12-13 LAB
BACTERIA UR QL AUTO: ABNORMAL /HPF
BILIRUB UR QL STRIP: NEGATIVE
CLARITY UR: CLEAR
COLOR UR: ABNORMAL
GLUCOSE UR STRIP-MCNC: NEGATIVE MG/DL
HGB UR QL STRIP.AUTO: ABNORMAL
KETONES UR STRIP-MCNC: NEGATIVE MG/DL
LEUKOCYTE ESTERASE UR QL STRIP: NEGATIVE
NITRITE UR QL STRIP: NEGATIVE
NON-SQ EPI CELLS URNS QL MICRO: ABNORMAL /HPF
PH UR STRIP.AUTO: 5 [PH]
PROT UR STRIP-MCNC: NEGATIVE MG/DL
RBC #/AREA URNS AUTO: ABNORMAL /HPF
SL AMB  POCT GLUCOSE, UA: NORMAL
SL AMB LEUKOCYTE ESTERASE,UA: NORMAL
SL AMB POCT BILIRUBIN,UA: NORMAL
SL AMB POCT BLOOD,UA: NORMAL
SL AMB POCT CLARITY,UA: CLEAR
SL AMB POCT COLOR,UA: YELLOW
SL AMB POCT KETONES,UA: NORMAL
SL AMB POCT NITRITE,UA: NORMAL
SL AMB POCT PH,UA: 5
SL AMB POCT SPECIFIC GRAVITY,UA: 1.01
SL AMB POCT URINE PROTEIN: NORMAL
SL AMB POCT UROBILINOGEN: NORMAL
SP GR UR STRIP.AUTO: 1.01 (ref 1–1.03)
UROBILINOGEN UR STRIP-ACNC: <2 MG/DL
WBC #/AREA URNS AUTO: ABNORMAL /HPF

## 2022-12-13 NOTE — PROGRESS NOTES
Name: Shawna Brochure      : 1985      MRN: [id number]  Encounter Provider: Bibi Brown MD  Encounter Date: 2022   Encounter department: [address] [place] [address] MEDICINE    Assessment & Plan     1  Annual physical exam  Assessment & Plan:  Pt is pregnant will be getting pregnancy panel blood work by ib will order lipids     Orders:  -     Lipid panel; Future; Expected date: 2022    2  Less than 8 weeks gestation of pregnancy  Assessment & Plan:  Start prenatals  lmp 11/3/22 EDC 23 5+ weeks gestation     Orders:  -     Ambulatory Referral to Obstetrics / Gynecology; Future    3  Dysuria  Assessment & Plan:  Mild only pt pregnant will check ua    Orders:  -     POCT urine dip  -     Urinalysis with microscopic    4  Other microscopic hematuria  Assessment & Plan:  Send Urine for UA with micro    Orders:  -     Urinalysis with microscopic           Subjective      HPI pt here for physical  Found out she is pregnant last  Pm  lmp 11/3/22  Review of Systems   Constitutional: Negative for appetite change, chills, fatigue and fever  Respiratory: Negative for cough, chest tightness and shortness of breath  Cardiovascular: Negative for chest pain, palpitations and leg swelling  Gastrointestinal: Positive for abdominal pain (had mild colicky lower abd pain now improving )  Negative for constipation, diarrhea, nausea and vomiting  Genitourinary: Positive for dysuria (very mild dysuria 4 days )  Negative for difficulty urinating and frequency  Musculoskeletal: Negative for arthralgias, back pain and neck pain  Skin: Negative for rash  Neurological: Negative for dizziness, weakness, light-headedness, numbness and headaches  Hematological: Does not bruise/bleed easily  Psychiatric/Behavioral: Negative for dysphoric mood and sleep disturbance  The patient is not nervous/anxious          Current Outpatient Medications on File Prior to Visit   Medication Sig   • [DISCONTINUED] CRYSELLE-28 0 3-30 MG-MCG per tablet TAKE 1 TABLET BY MOUTH EVERY DAY (Patient not taking: Reported on 12/13/2022)   • [DISCONTINUED] meclizine (ANTIVERT) 12 5 MG tablet Take 1 tablet (12 5 mg total) by mouth 3 (three) times a day as needed for dizziness (Patient not taking: Reported on 12/13/2022)       Objective     /72 (BP Location: Left arm, Patient Position: Sitting, Cuff Size: Standard)   Pulse 97   Temp (!) 97 2 °F (36 2 °C) (Temporal)   Resp 16   Ht 5' 1 75" (1 568 m)   Wt 57 6 kg (127 lb)   LMP 11/03/2022 (Exact Date)   SpO2 97%   BMI 23 42 kg/m²     Physical Exam  Vitals reviewed  Constitutional:       General: She is not in acute distress  Appearance: Normal appearance  She is well-developed  HENT:      Head: Normocephalic  Right Ear: Tympanic membrane, ear canal and external ear normal       Left Ear: Tympanic membrane, ear canal and external ear normal       Nose: Nose normal       Mouth/Throat:      Pharynx: No oropharyngeal exudate  Eyes:      General: Lids are normal       Extraocular Movements: Extraocular movements intact  Conjunctiva/sclera: Conjunctivae normal       Pupils: Pupils are equal, round, and reactive to light  Neck:      Thyroid: No thyromegaly  Vascular: No carotid bruit  Cardiovascular:      Rate and Rhythm: Normal rate and regular rhythm  Pulses: Normal pulses  Heart sounds: Normal heart sounds  No murmur heard  No friction rub  Pulmonary:      Effort: Pulmonary effort is normal  No respiratory distress  Breath sounds: Normal breath sounds  No stridor  No wheezing or rales  Chest:   Breasts:     Breasts are symmetrical       Right: Normal  No swelling, bleeding, inverted nipple, mass, nipple discharge, skin change or tenderness  Left: Normal  No swelling, bleeding, inverted nipple, mass, nipple discharge, skin change or tenderness        Comments: Bilateral implants    Abdominal:      General: Bowel sounds are normal  There is no distension  Palpations: Abdomen is soft  There is no mass  Tenderness: There is no abdominal tenderness  There is no guarding  Hernia: No hernia is present  Musculoskeletal:         General: Normal range of motion  Cervical back: Full passive range of motion without pain, normal range of motion and neck supple  Lymphadenopathy:      Cervical: No cervical adenopathy  Skin:     General: Skin is warm and dry  Findings: No rash  Comments: Nl appearing moles   Neurological:      General: No focal deficit present  Mental Status: She is alert and oriented to person, place, and time  Mental status is at baseline  Cranial Nerves: No cranial nerve deficit  Sensory: No sensory deficit  Motor: No abnormal muscle tone  Coordination: Coordination normal       Gait: Gait normal       Deep Tendon Reflexes: Reflexes normal  Babinski sign absent on the right side  Psychiatric:         Mood and Affect: Mood normal          Speech: Speech normal          Behavior: Behavior normal          Thought Content:  Thought content normal          Judgment: Judgment normal        Lydia Novak MD

## 2022-12-14 ENCOUNTER — HOSPITAL ENCOUNTER (EMERGENCY)
Facility: HOSPITAL | Age: 37
Discharge: HOME | End: 2022-12-14
Attending: EMERGENCY MEDICINE

## 2022-12-14 ENCOUNTER — APPOINTMENT (EMERGENCY)
Dept: ULTRASOUND IMAGING | Facility: HOSPITAL | Age: 37
End: 2022-12-14

## 2022-12-14 VITALS
DIASTOLIC BLOOD PRESSURE: 55 MMHG | WEIGHT: 127 LBS | RESPIRATION RATE: 16 BRPM | SYSTOLIC BLOOD PRESSURE: 92 MMHG | HEART RATE: 77 BPM | OXYGEN SATURATION: 100 % | BODY MASS INDEX: 23.42 KG/M2 | TEMPERATURE: 98.1 F

## 2022-12-14 DIAGNOSIS — R82.71 BACTERIURIA DURING PREGNANCY IN FIRST TRIMESTER: ICD-10-CM

## 2022-12-14 DIAGNOSIS — O26.891 ABDOMINAL PAIN DURING PREGNANCY IN FIRST TRIMESTER: Primary | ICD-10-CM

## 2022-12-14 DIAGNOSIS — O99.891 BACTERIURIA DURING PREGNANCY IN FIRST TRIMESTER: ICD-10-CM

## 2022-12-14 DIAGNOSIS — R10.9 ABDOMINAL PAIN DURING PREGNANCY IN FIRST TRIMESTER: Primary | ICD-10-CM

## 2022-12-14 DIAGNOSIS — D25.9 UTERINE FIBROID: ICD-10-CM

## 2022-12-14 LAB
ANION GAP SERPL CALCULATED.3IONS-SCNC: 8 MMOL/L (ref 4–13)
B-HCG SERPL-ACNC: 1666 MIU/ML (ref 0–11.6)
BACTERIA UR QL AUTO: NORMAL /HPF
BASOPHILS # BLD AUTO: 0.04 THOUSANDS/ÂΜL (ref 0–0.1)
BASOPHILS NFR BLD AUTO: 1 % (ref 0–1)
BILIRUB UR QL STRIP: NEGATIVE
BUN SERPL-MCNC: 11 MG/DL (ref 5–25)
CALCIUM SERPL-MCNC: 9 MG/DL (ref 8.4–10.2)
CHLORIDE SERPL-SCNC: 106 MMOL/L (ref 96–108)
CLARITY UR: CLEAR
CO2 SERPL-SCNC: 25 MMOL/L (ref 21–32)
COLOR UR: YELLOW
CREAT SERPL-MCNC: 0.64 MG/DL (ref 0.6–1.3)
EOSINOPHIL # BLD AUTO: 0.06 THOUSAND/ÂΜL (ref 0–0.61)
EOSINOPHIL NFR BLD AUTO: 1 % (ref 0–6)
ERYTHROCYTE [DISTWIDTH] IN BLOOD BY AUTOMATED COUNT: 12.2 % (ref 11.6–15.1)
EXT PREGNANCY TEST URINE: POSITIVE
EXT. CONTROL: ABNORMAL
GFR SERPL CREATININE-BSD FRML MDRD: 114 ML/MIN/1.73SQ M
GLUCOSE SERPL-MCNC: 95 MG/DL (ref 65–140)
GLUCOSE UR STRIP-MCNC: NEGATIVE MG/DL
HCT VFR BLD AUTO: 39 % (ref 34.8–46.1)
HGB BLD-MCNC: 12.8 G/DL (ref 11.5–15.4)
HGB UR QL STRIP.AUTO: ABNORMAL
IMM GRANULOCYTES # BLD AUTO: 0.02 THOUSAND/UL (ref 0–0.2)
IMM GRANULOCYTES NFR BLD AUTO: 0 % (ref 0–2)
KETONES UR STRIP-MCNC: NEGATIVE MG/DL
LEUKOCYTE ESTERASE UR QL STRIP: NEGATIVE
LYMPHOCYTES # BLD AUTO: 2.88 THOUSANDS/ÂΜL (ref 0.6–4.47)
LYMPHOCYTES NFR BLD AUTO: 39 % (ref 14–44)
MCH RBC QN AUTO: 29.2 PG (ref 26.8–34.3)
MCHC RBC AUTO-ENTMCNC: 32.8 G/DL (ref 31.4–37.4)
MCV RBC AUTO: 89 FL (ref 82–98)
MONOCYTES # BLD AUTO: 0.49 THOUSAND/ÂΜL (ref 0.17–1.22)
MONOCYTES NFR BLD AUTO: 7 % (ref 4–12)
NEUTROPHILS # BLD AUTO: 3.98 THOUSANDS/ÂΜL (ref 1.85–7.62)
NEUTS SEG NFR BLD AUTO: 52 % (ref 43–75)
NITRITE UR QL STRIP: NEGATIVE
NON-SQ EPI CELLS URNS QL MICRO: NORMAL /HPF
NRBC BLD AUTO-RTO: 0 /100 WBCS
PH UR STRIP.AUTO: 6.5 [PH]
PLATELET # BLD AUTO: 245 THOUSANDS/UL (ref 149–390)
PMV BLD AUTO: 9.3 FL (ref 8.9–12.7)
POTASSIUM SERPL-SCNC: 3.5 MMOL/L (ref 3.5–5.3)
PROT UR STRIP-MCNC: NEGATIVE MG/DL
RBC # BLD AUTO: 4.39 MILLION/UL (ref 3.81–5.12)
RBC #/AREA URNS AUTO: NORMAL /HPF
SODIUM SERPL-SCNC: 139 MMOL/L (ref 135–147)
SP GR UR STRIP.AUTO: 1.01 (ref 1–1.03)
UROBILINOGEN UR QL STRIP.AUTO: 0.2 E.U./DL
WBC # BLD AUTO: 7.47 THOUSAND/UL (ref 4.31–10.16)
WBC #/AREA URNS AUTO: NORMAL /HPF

## 2022-12-14 RX ORDER — CEPHALEXIN 500 MG/1
500 CAPSULE ORAL EVERY 12 HOURS SCHEDULED
Qty: 14 CAPSULE | Refills: 0 | Status: SHIPPED | OUTPATIENT
Start: 2022-12-14 | End: 2022-12-21

## 2022-12-14 RX ORDER — CEPHALEXIN 500 MG/1
500 CAPSULE ORAL EVERY 6 HOURS SCHEDULED
Qty: 28 CAPSULE | Refills: 0 | Status: SHIPPED | OUTPATIENT
Start: 2022-12-14 | End: 2022-12-14

## 2022-12-14 NOTE — ED PROVIDER NOTES
History  No chief complaint on file  HPI    None       Past Medical History:   Diagnosis Date   • Chicken pox    • H/O abdominoplasty    • H/O breast implant    • Headache    • Hepatitis    • Migraine    • Premature rupture of membranes with onset of labor within 24 hours of rupture        Past Surgical History:   Procedure Laterality Date   • BREAST IMPLANT     •  SECTION     • CHOLECYSTECTOMY     • [place]  DELIVERY ONLY N/A 9/10/2016    Procedure:  SECTION (); Surgeon: Darrell Yuan MD;  Location: [facility];  Service: Obstetrics       Family History   Problem Relation Age of Onset   • Heart failure Mother    • Diabetes Father      I have reviewed and agree with the history as documented  E-Cigarette/Vaping   • E-Cigarette Use Never User      E-Cigarette/Vaping Substances   • Nicotine No    • CBD No    • Flavoring No    • Unknown No      Social History     Tobacco Use   • Smoking status: Never   • Smokeless tobacco: Never   • Tobacco comments:     Per [employer]:  Never a smoker   Vaping Use   • Vaping Use: Never used   Substance Use Topics   • Alcohol use: Not Currently     Comment: socially    • Drug use: No       Review of Systems    Physical Exam  Physical Exam    Vital Signs  ED Triage Vitals   Temp Pulse Resp BP SpO2   -- -- -- -- --      Temp src Heart Rate Source Patient Position - Orthostatic VS BP Location FiO2 (%)   -- -- -- -- --      Pain Score       --           There were no vitals filed for this visit  Visual Acuity      ED Medications  Medications - No data to display    Diagnostic Studies  Results Reviewed     None                 No orders to display              Procedures  Procedures         ED Course                                             MDM    Disposition  Final diagnoses:   None     ED Disposition     None      Follow-up Information    None         Patient's Medications    No medications on file       No discharge procedures on file      PDMP Review None          ED Provider  Electronically Signed by

## 2022-12-14 NOTE — ED PROVIDER NOTES
History  Chief Complaint   Patient presents with   • Abdominal Pain     Pt reports she started with abd pain on Saturday, found out she was pregnant on Monday  Reports pain was worse last night  59-year-old G2, P1 female at approximately 6 weeks gestation by dates presents to the emergency department for evaluation of pelvic pain  The patient is Malay speaking only and an  was used for translation  The patient states that she has been having episodic lower abdominal pain that started 5 days ago  She describes it as a sharp bilateral pulling sensation  She states that she found out 2 days ago that she was pregnant by taking an at home pregnancy test   Her last menstrual period was on 11/3/2022  Per chart review the patient had a visit with her PCP yesterday  Was noted to have some abdominal pain but it was reported that it was improving  The patient states that last night and then again today she had 2 more episodes of the same pain  She reports that she called her PCP who recommended that she come to the emergency department for further evaluation  On review of systems the patient notes mild dysuria  She denies fevers, chills, nausea, vomiting, diarrhea, vaginal bleeding and vaginal discharge  None       Past Medical History:   Diagnosis Date   • Chicken pox    • H/O abdominoplasty    • H/O breast implant    • Headache    • Hepatitis    • Migraine    • Premature rupture of membranes with onset of labor within 24 hours of rupture        Past Surgical History:   Procedure Laterality Date   • BREAST IMPLANT     •  SECTION     • CHOLECYSTECTOMY     • ME  DELIVERY ONLY N/A 9/10/2016    Procedure:  SECTION ();   Surgeon: Marla Charles MD;  Location: [facility];  Service: Obstetrics       Family History   Problem Relation Age of Onset   • Heart failure Mother    • Diabetes Father      I have reviewed and agree with the history as documented  E-Cigarette/Vaping   • E-Cigarette Use Never User      E-Cigarette/Vaping Substances   • Nicotine No    • CBD No    • Flavoring No    • Unknown No      Social History     Tobacco Use   • Smoking status: Never   • Smokeless tobacco: Never   • Tobacco comments:     Per [employer]:  Never a smoker   Vaping Use   • Vaping Use: Never used   Substance Use Topics   • Alcohol use: Not Currently     Comment: socially    • Drug use: No       Review of Systems   Constitutional: Negative for chills and fever  HENT: Negative for ear pain and sore throat  Eyes: Negative for pain and visual disturbance  Respiratory: Negative for cough and shortness of breath  Cardiovascular: Negative for chest pain and palpitations  Gastrointestinal: Negative for abdominal pain and vomiting  Genitourinary: Positive for dysuria and pelvic pain  Negative for hematuria  Musculoskeletal: Negative for arthralgias and back pain  Skin: Negative for color change and rash  Neurological: Negative for seizures and syncope  All other systems reviewed and are negative  Physical Exam  Physical Exam  Vitals and nursing note reviewed  Constitutional:       General: She is not in acute distress  Appearance: She is well-developed  HENT:      Head: Normocephalic and atraumatic  Right Ear: External ear normal       Left Ear: External ear normal       Nose: Nose normal    Eyes:      Conjunctiva/sclera: Conjunctivae normal    Cardiovascular:      Rate and Rhythm: Normal rate and regular rhythm  Heart sounds: No murmur heard  Pulmonary:      Effort: Pulmonary effort is normal  No respiratory distress  Breath sounds: Normal breath sounds  Abdominal:      Palpations: Abdomen is soft  Tenderness: There is abdominal tenderness in the suprapubic area  There is no guarding or rebound  Musculoskeletal:         General: No swelling or tenderness        Cervical back: Normal range of motion and neck supple  Skin:     General: Skin is warm and dry  Capillary Refill: Capillary refill takes less than 2 seconds  Neurological:      Mental Status: She is alert and oriented to person, place, and time  Motor: Motor function is intact  Gait: Gait is intact     Psychiatric:         Mood and Affect: Mood normal          Behavior: Behavior normal          Vital Signs  ED Triage Vitals [12/14/22 1556]   Temperature Pulse Respirations Blood Pressure SpO2   98 1 °F (36 7 °C) 77 16 92/55 100 %      Temp Source Heart Rate Source Patient Position - Orthostatic VS BP Location FiO2 (%)   Oral Monitor -- -- --      Pain Score       2           Vitals:    12/14/22 1556   BP: 92/55   Pulse: 77         Visual Acuity      ED Medications  Medications - No data to display    Diagnostic Studies  Results Reviewed     Procedure Component Value Units Date/Time    hCG, quantitative [[id number]]  (Abnormal) Collected: 12/14/22 1633    Lab Status: Final result Specimen: Blood from Arm, Left Updated: 12/14/22 1735     HCG, Quant 1,666 mIU/mL     Narrative:       Expected Ranges:     Approximate               Approximate HCG  Gestation age          Concentration ( mIU/mL)  _____________          ______________________   Letty Champagne                      HCG values  0 2-1                       5-50  1-2                           2-3                         100-5000  3-4                         500-00338  4-5                         1000-48084  5-6                         78554-252740  6-8                         13883-924445  8-12                        56806-784706      Urine Microscopic [[id number]] Collected: 12/14/22 1633    Lab Status: Final result Specimen: Urine, Clean Catch Updated: 12/14/22 1710     RBC, UA 0-1 /hpf      WBC, UA 0-1 /hpf      Epithelial Cells Occasional /hpf      Bacteria, UA Occasional /hpf      URINE COMMENT --    Basic metabolic panel [[id number]] Collected: 12/14/22 1633    Lab Status: Final result Specimen: Blood from Arm, Left Updated: 12/14/22 1704     Sodium 139 mmol/L      Potassium 3 5 mmol/L      Chloride 106 mmol/L      CO2 25 mmol/L      ANION GAP 8 mmol/L      BUN 11 mg/dL      Creatinine 0 64 mg/dL      Glucose 95 mg/dL      Calcium 9 0 mg/dL      eGFR 114 ml/min/1 73sq m     Narrative:      [place] guidelines for Chronic Kidney Disease (CKD):   •  Stage 1 with normal or high GFR (GFR > 90 mL/min/1 73 square meters)  •  Stage 2 Mild CKD (GFR = 60-89 mL/min/1 73 square meters)  •  Stage 3A Moderate CKD (GFR = 45-59 mL/min/1 73 square meters)  •  Stage 3B Moderate CKD (GFR = 30-44 mL/min/1 73 square meters)  •  Stage 4 Severe CKD (GFR = 15-29 mL/min/1 73 square meters)  •  Stage 5 End Stage CKD (GFR <15 mL/min/1 73 square meters)  Note: GFR calculation is accurate only with a steady state creatinine    UA w Reflex to Microscopic w Reflex to Culture [[id number]]  (Abnormal) Collected: 12/14/22 1633    Lab Status: Final result Specimen: Urine, Clean Catch Updated: 12/14/22 1704     Color, UA Yellow     Clarity, UA Clear     Specific Gravity, UA 1 010     pH, UA 6 5     Leukocytes, UA Negative     Nitrite, UA Negative     Protein, UA Negative mg/dl      Glucose, UA Negative mg/dl      Ketones, UA Negative mg/dl      Urobilinogen, UA 0 2 E U /dl      Bilirubin, UA Negative     Occult Blood, UA Trace-Intact     URINE COMMENT --    Urine culture [[id number]] Collected: 12/14/22 1633    Lab Status:  In process Specimen: Urine, Clean Catch Updated: 12/14/22 1704    POCT pregnancy, urine [[id number]]  (Abnormal) Resulted: 12/14/22 1643    Lab Status: Final result Updated: 12/14/22 1644     EXT Preg Test, Ur Positive     Control Valid    CBC and differential [[id number]] Collected: 12/14/22 1633    Lab Status: Final result Specimen: Blood from Arm, Left Updated: 12/14/22 1641     WBC 7 47 Thousand/uL      RBC 4 39 Million/uL      Hemoglobin 12 8 g/dL      Hematocrit 39 0 %      MCV 89 fL MCH 29 2 pg      MCHC 32 8 g/dL      RDW 12 2 %      MPV 9 3 fL      Platelets 978 Thousands/uL      nRBC 0 /100 WBCs      Neutrophils Relative 52 %      Immat GRANS % 0 %      Lymphocytes Relative 39 %      Monocytes Relative 7 %      Eosinophils Relative 1 %      Basophils Relative 1 %      Neutrophils Absolute 3 98 Thousands/µL      Immature Grans Absolute 0 02 Thousand/uL      Lymphocytes Absolute 2 88 Thousands/µL      Monocytes Absolute 0 49 Thousand/µL      Eosinophils Absolute 0 06 Thousand/µL      Basophils Absolute 0 04 Thousands/µL                  US OB pregnancy limited with transvaginal   Final Result by Gini Harrington MD ( [id number])      1  No definite intrauterine gestation or adnexal mass identified  Differential remains early IUP, spontaneous  and ectopic pregnancy  Correlate with serial quantitative BHCG and follow-up ultrasound  2   Indeterminate hypoechogenic focus abutting the left fundal aspect of the endometrium, possibly a fibroid though not present previously  Attention on follow-up is recommended  Workstation performed: [id number]                    Procedures  Procedures         ED Course                   MDM  Number of Diagnoses or Management Options  Abdominal pain during pregnancy in first trimester  Bacteriuria during pregnancy in first trimester  Uterine fibroid  Diagnosis management comments: 49-year-old female approximately 6 weeks gestation presented to the emergency department for evaluation of abdominal pain  On exam the patient had mild suprapubic tenderness to palpation  Ultrasound with no definite intrauterine gestation or adnexal mass identified  Ultrasound also noted a  Indeterminate hypoechogenic focus abutting the left fundal aspect of the endometrium, possibly a fibroid  All diagnostic studies were discussed with the patient in detail  The patient was noted to have bacteria on her UA that was not present on her previous urinalysis  Patient given a prescription for a course of Keflex  The patient was also provided with a prescription for a repeat hCG level in 48 to 72 hours  Recommendation was made for the patient to follow-up with her OB/GYN  The patient was also provided with contact information to establish care with a new OB/GYN  Return precautions were discussed  Patient agrees with the plan for discharge and feels comfortable to go home with proper f/u  Advised to return for worsening or additional problems  Diagnostic tests were reviewed and questions answered  Diagnosis, care plan and treatment options were discussed  The patient understands instructions and will follow up as directed  Disposition  Final diagnoses:   Bacteriuria during pregnancy in first trimester   Abdominal pain during pregnancy in first trimester   Uterine fibroid     Time reflects when diagnosis was documented in both MDM as applicable and the Disposition within this note     Time User Action Codes Description Comment    12/14/2022  5:20 PM Ena Shah Add [E92 686,  R82 71] Bacteriuria during pregnancy in first trimester     12/14/2022  5:21 PM Ena Shah Add [O26 891,  R10 9] Abdominal pain during pregnancy in first trimester     12/14/2022  5:21 PM Lupe Niece [Q40 932,  R82 71] Bacteriuria during pregnancy in first trimester     12/14/2022  5:21 PM Ena Beardell Modify [B30 877,  R10 9] Abdominal pain during pregnancy in first trimester     12/14/2022  6:11 PM Ena Shah Add [D25 9] Uterine fibroid       ED Disposition     ED Disposition   Discharge    Condition   Stable    Date/Time   Wed Dec 14, 2022  5:54 PM    [address] [place] [address] discharge to home/self care                 Follow-up Information     Follow up With Specialties Details Why Contact Info Additional [id number]  Emergency Department Emergency Medicine Go to  If symptoms worsen [address] [facility] [address] Emergency Department, [address], [address] [facility] Obstetrics and Gynecology Schedule an appointment as soon as possible for a visit   [address]  [place] [address]  [facility], [address], [place], [place], [id number] [phone]          Discharge Medication List as of 12/14/2022  6:00 PM      START taking these medications    Details   cephalexin (KEFLEX) 500 mg capsule Take 1 capsule (500 mg total) by mouth every 6 (six) hours for 7 days, Starting Wed 12/14/2022, Until Wed 12/21/2022, Normal             Outpatient Discharge Orders   hCG, quantitative   Standing Status: Future Standing Exp   Date: 12/14/23       PDMP Review     None          ED Provider  Electronically Signed by           Gisella Johnson MD  12/14/22 0288

## 2022-12-15 LAB — BACTERIA UR CULT: NORMAL

## 2023-05-16 ENCOUNTER — ANNUAL EXAM (OUTPATIENT)
Dept: OBGYN CLINIC | Facility: CLINIC | Age: 38
End: 2023-05-16

## 2023-05-16 VITALS
HEIGHT: 62 IN | DIASTOLIC BLOOD PRESSURE: 62 MMHG | BODY MASS INDEX: 22.82 KG/M2 | SYSTOLIC BLOOD PRESSURE: 114 MMHG | WEIGHT: 124 LBS

## 2023-05-16 DIAGNOSIS — Z01.419 ENCOUNTER FOR GYNECOLOGICAL EXAMINATION WITHOUT ABNORMAL FINDING: Primary | ICD-10-CM

## 2023-05-16 DIAGNOSIS — Z30.41 ENCOUNTER FOR SURVEILLANCE OF CONTRACEPTIVE PILLS: ICD-10-CM

## 2023-05-16 DIAGNOSIS — D21.9 FIBROID: ICD-10-CM

## 2023-05-16 RX ORDER — DROSPIRENONE AND ETHINYL ESTRADIOL 0.03MG-3MG
1 KIT ORAL DAILY
COMMUNITY
Start: 2023-05-08 | End: 2023-05-16 | Stop reason: SDUPTHER

## 2023-05-16 RX ORDER — DROSPIRENONE AND ETHINYL ESTRADIOL 0.03MG-3MG
1 KIT ORAL DAILY
Qty: 84 TABLET | Refills: 3 | Status: SHIPPED | OUTPATIENT
Start: 2023-05-16

## 2023-05-16 NOTE — PROGRESS NOTES
Leticia Ro is a 40 y o  female who presents for annual well woman exam  Periods are regular every 28-30 days, lasting 5 days  No intermenstrual bleeding, spotting, or discharge  Current contraception: OCP (estrogen/progesterone)   Present contraception option explained and discussed with patient in details with risk-benefit side effect patient desires IUD all type of IUD reviewed and discussed interested in [facility] IUD  History of abnormal Pap smear: no  Family history of uterine or ovarian cancer: no    Family history of breast cancer: no    Menstrual History:  OB History        2    Para   1    Term   1            AB        Living   1       SAB        IAB        Ectopic        Multiple   0    Live Births   1                  Patient's last menstrual period was 2023 (exact date)  Period Cycle (Days): 28  Period Duration (Days): 3  Period Pattern: Regular  Menstrual Flow: Moderate  Dysmenorrhea: None    The following portions of the patient's history were reviewed and updated as appropriate: allergies, current medications, past family history, past medical history, past social history, past surgical history and problem list     Review of Systems  Review of Systems   Constitutional: Negative for activity change, appetite change, chills, fatigue and fever  Respiratory: Negative for apnea, cough, chest tightness and shortness of breath  Cardiovascular: Negative for chest pain, palpitations and leg swelling  Gastrointestinal: Negative for abdominal pain, constipation, diarrhea, nausea and vomiting  Genitourinary: Negative for difficulty urinating, dysuria, flank pain, frequency, hematuria and urgency  Neurological: Negative for dizziness, seizures, syncope, light-headedness, numbness and headaches  Psychiatric/Behavioral: Negative for agitation and confusion            Objective      /62 (BP Location: Left arm, Patient Position: Sitting, Cuff Size: Adult) " Ht 5' 1 75\" (1 568 m)   Wt 56 2 kg (124 lb)   LMP 2023 (Exact Date)   BMI 22 86 kg/m²     Physical Exam  OBGyn Exam     General:   alert and oriented, in no acute distress, alert, appears stated age and cooperative   Heart: regular rate and rhythm, S1, S2 normal, no murmur, click, rub or gallop   Lungs: clear to auscultation bilaterally   Abdomen: soft, non-tender, without masses or organomegaly   Vulva: normal   Vagina: normal mucosa, normal discharge   Cervix: no cervical motion tenderness and no lesions   Uterus: normal size   Adnexa:  Breast Exam:  normal adnexa  breasts appear normal, no suspicious masses, no skin or nipple changes or axillary nodes  Assessment      @well woman@   80-year-old female  Annual exam  OCP contraception  Desires IUD [facility]  Prior   Fibroid uterus with history of ovarian cyst  Plan   Pap/HPV  Diet/exercise  Calcium/vitamin D  Continue OCP till IUD inserted  Pelvic ultrasound follow-up on ovarian cyst and fibroid uterus  Return to office for IUD [place] insertion   All questions answered  There are no Patient Instructions on file for this visit       "

## 2023-05-23 LAB
LAB AP GYN PRIMARY INTERPRETATION: NORMAL
Lab: NORMAL

## 2023-08-04 ENCOUNTER — HOSPITAL ENCOUNTER (OUTPATIENT)
Dept: ULTRASOUND IMAGING | Facility: HOSPITAL | Age: 38
End: 2023-08-04
Attending: OBSTETRICS & GYNECOLOGY
Payer: COMMERCIAL

## 2023-08-04 DIAGNOSIS — D21.9 FIBROID: ICD-10-CM

## 2023-08-04 PROCEDURE — 76830 TRANSVAGINAL US NON-OB: CPT

## 2023-08-04 PROCEDURE — 76856 US EXAM PELVIC COMPLETE: CPT

## 2024-06-21 ENCOUNTER — TELEPHONE (OUTPATIENT)
Age: 39
End: 2024-06-21

## 2024-06-21 NOTE — TELEPHONE ENCOUNTER
Was calling to schedule appt for her. Did not have any info as to why need appt Will call back with appt details

## 2024-07-24 ENCOUNTER — OFFICE VISIT (OUTPATIENT)
Dept: FAMILY MEDICINE CLINIC | Facility: CLINIC | Age: 39
End: 2024-07-24
Payer: COMMERCIAL

## 2024-07-24 VITALS
WEIGHT: 125 LBS | HEART RATE: 99 BPM | OXYGEN SATURATION: 99 % | BODY MASS INDEX: 23 KG/M2 | TEMPERATURE: 98.7 F | HEIGHT: 62 IN | RESPIRATION RATE: 16 BRPM | SYSTOLIC BLOOD PRESSURE: 120 MMHG | DIASTOLIC BLOOD PRESSURE: 78 MMHG

## 2024-07-24 DIAGNOSIS — Z00.00 ANNUAL PHYSICAL EXAM: Primary | ICD-10-CM

## 2024-07-24 DIAGNOSIS — L29.9 ITCHING: ICD-10-CM

## 2024-07-24 PROBLEM — R42 VERTIGO: Status: RESOLVED | Noted: 2021-10-28 | Resolved: 2024-07-24

## 2024-07-24 PROBLEM — R31.29 OTHER MICROSCOPIC HEMATURIA: Status: RESOLVED | Noted: 2022-12-13 | Resolved: 2024-07-24

## 2024-07-24 PROBLEM — R30.0 DYSURIA: Status: RESOLVED | Noted: 2022-12-13 | Resolved: 2024-07-24

## 2024-07-24 PROBLEM — Z3A.01 LESS THAN 8 WEEKS GESTATION OF PREGNANCY: Status: RESOLVED | Noted: 2022-12-13 | Resolved: 2024-07-24

## 2024-07-24 PROCEDURE — 99395 PREV VISIT EST AGE 18-39: CPT | Performed by: FAMILY MEDICINE

## 2024-07-24 RX ORDER — DIPHENHYDRAMINE HCL 25 MG
25 TABLET ORAL EVERY 6 HOURS PRN
COMMUNITY

## 2024-07-24 NOTE — PROGRESS NOTES
Subjective: Patient here for annual physical       Patient ID: Maricarmen Rockwell is a 39 y.o. female.    HPI    Past Medical History:   Diagnosis Date   • Chicken pox    • H/O abdominoplasty    • H/O breast implant    • Headache    • Hepatitis    • Migraine    • Premature rupture of membranes with onset of labor within 24 hours of rupture        Family History   Problem Relation Age of Onset   • Bradycardia Mother    • Diabetes Father        Past Surgical History:   Procedure Laterality Date   • BREAST IMPLANT     •  SECTION     • CHOLECYSTECTOMY     • [place]  DELIVERY ONLY N/A 9/10/2016    Procedure:  SECTION ();  Surgeon: Brooks Silverman MD;  Location: [facility];  Service: Obstetrics        reports that she has never smoked. She has never used smokeless tobacco. She reports that she does not currently use alcohol. She reports that she does not use drugs.      Current Outpatient Medications:   •  diphenhydrAMINE (BENADRYL) 25 mg tablet, Take 25 mg by mouth every 6 (six) hours as needed for itching, Disp: , Rfl:     The following portions of the patient's history were reviewed and updated as appropriate: allergies, current medications, past family history, past medical history, past social history, past surgical history and problem list.    Review of Systems   Constitutional:  Negative for appetite change, chills, fatigue and fever.   Respiratory:  Negative for cough, chest tightness and shortness of breath.    Cardiovascular:  Negative for chest pain, palpitations and leg swelling.   Gastrointestinal:  Negative for abdominal pain, constipation, diarrhea, nausea and vomiting.   Genitourinary:  Negative for difficulty urinating and frequency.   Musculoskeletal:  Negative for arthralgias, back pain, gait problem and neck pain.   Skin:  Negative for rash.        Itchy hands    Neurological:  Negative for dizziness, weakness, light-headedness, numbness and headaches.   Hematological:  Does not  "bruise/bleed easily.   Psychiatric/Behavioral:  Negative for dysphoric mood and sleep disturbance. The patient is not nervous/anxious.          PHQ-2/9 Depression Screening    Little interest or pleasure in doing things: 0 - not at all  Feeling down, depressed, or hopeless: 0 - not at all  PHQ-2 Score: 0  PHQ-2 Interpretation: Negative depression screen             Objective:    /78 (BP Location: Left arm, Patient Position: Sitting, Cuff Size: Standard)   Pulse 99   Temp 98.7 °F (37.1 °C) (Tympanic)   Resp 16   Ht 5' 1.75\" (1.568 m)   Wt 56.7 kg (125 lb)   SpO2 99%   BMI 23.05 kg/m²      Physical Exam  Vitals reviewed.   Constitutional:       General: She is not in acute distress.     Appearance: Normal appearance. She is well-developed. She is not ill-appearing.   HENT:      Head: Normocephalic.      Right Ear: Tympanic membrane, ear canal and external ear normal.      Left Ear: Tympanic membrane, ear canal and external ear normal.      Nose: Nose normal.      Mouth/Throat:      Mouth: Mucous membranes are moist.      Pharynx: No oropharyngeal exudate.   Eyes:      General: Lids are normal. No scleral icterus.     Extraocular Movements: Extraocular movements intact.      Conjunctiva/sclera: Conjunctivae normal.      Pupils: Pupils are equal, round, and reactive to light.   Neck:      Thyroid: No thyromegaly.      Vascular: No carotid bruit.   Cardiovascular:      Rate and Rhythm: Normal rate and regular rhythm.      Pulses: Normal pulses.      Heart sounds: Normal heart sounds. No murmur heard.     No friction rub.   Pulmonary:      Effort: Pulmonary effort is normal. No respiratory distress.      Breath sounds: Normal breath sounds. No stridor. No wheezing, rhonchi or rales.   Chest:   Breasts:     Breasts are symmetrical.      Right: Normal. No swelling, bleeding, inverted nipple, mass, nipple discharge, skin change or tenderness.      Left: Normal. No swelling, bleeding, inverted nipple, mass, " nipple discharge, skin change or tenderness.      Comments: Bilateral implants  Abdominal:      General: Bowel sounds are normal. There is no distension.      Palpations: Abdomen is soft. There is no mass.      Tenderness: There is no abdominal tenderness. There is no guarding.      Hernia: No hernia is present.   Musculoskeletal:         General: Normal range of motion.      Cervical back: Full passive range of motion without pain, normal range of motion and neck supple.   Lymphadenopathy:      Cervical: No cervical adenopathy.   Skin:     General: Skin is warm and dry.      Findings: No rash.      Comments: No abnormal appearing moles   Neurological:      General: No focal deficit present.      Mental Status: She is alert and oriented to person, place, and time. Mental status is at baseline.      Cranial Nerves: No cranial nerve deficit.      Sensory: No sensory deficit.      Motor: No weakness, tremor or abnormal muscle tone.      Coordination: Coordination normal.      Gait: Gait normal.      Deep Tendon Reflexes: Reflexes normal. Babinski sign absent on the right side.   Psychiatric:         Mood and Affect: Mood normal.         Speech: Speech normal.         Behavior: Behavior normal.         Thought Content: Thought content normal.         Judgment: Judgment normal.           No results found for this or any previous visit (from the past 8736 hour(s)).    Laboratory Results: I have personally reviewed the pertinent laboratory results/reports     Radiology/Other Diagnostic Testing Results: I have personally reviewed pertinent reports.      US pelvis complete w transvaginal    Result Date: 8/10/2023  PELVIC ULTRASOUND, COMPLETE INDICATION:  The patient is 38 years old.  D21.9: Benign neoplasm of connective and other soft tissue, unspecified. COMPARISON: Pelvic ultrasound dated 12/14/2022. TECHNIQUE:   Transabdominal pelvic ultrasound was performed in sagittal and transverse planes with a curvilinear transducer.  " Additional transvaginal imaging was performed to better evaluate the endometrium and ovaries.  Imaging included volumetric sweeps as well as traditional still imaging technique. FINDINGS: UTERUS: The uterus is anteverted in position, measuring 9.6 x 3.9 x 5.4 cm. There is a 0.5 x 0.6 x 0.8 cm hypoechoic focus in the right uterine body, likely an intramural fibroid. An adjacent 0.6 x 0.3 x 0.5 cm hypoechoic focus is also likely a fibroid. A tiny hypoechoic focus in the posterior uterus is nonspecific. The cervix appears within normal limits. ENDOMETRIUM: The endometrial echo complex has an AP caliber of 2.9 mm. Small fluid is noted in the endometrial cavity. There is no sonographically evident focal endometrial mass. OVARIES/ADNEXA: Right ovary:  2.5 x 1.4 x 2.2 cm. 4.0 mL. Left ovary:  2.8 x 1.5 x 1.7 cm. 3.9 mL. Ovarian Doppler flow is within normal limits. No suspicious ovarian or adnexal abnormality. OTHER: No free fluid or loculated fluid collections.     Few subcentimeter intramural uterine fibroids. Workstation performed: [id number]        Assessment/Plan:  1. Annual physical exam  Assessment & Plan:  Check routine labs    Orders:  -     CBC and differential; Future  -     Comprehensive metabolic panel; Future; Expected date: 07/24/2024  -     Lipid panel; Future; Expected date: 07/24/2024  -     TSH, 3rd generation; Future  2. 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    Patient was given opportunity to ask questions and all questions were answered.    Disclaimer: Portions of the record may have been created with voice recognition software. Occasional wrong word or \"sound a like\" substitutions may have occurred due to the inherent limitations of voice recognition software. Read the chart " carefully and recognize, using context, where substitutions have occurred. I have used the Epic copy/forward function to compose this note. I have reviewed my current note to ensure it reflects the current patient status, exam, assessment and plan.

## 2024-07-24 NOTE — ASSESSMENT & PLAN NOTE
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

## 2024-08-08 ENCOUNTER — ANNUAL EXAM (OUTPATIENT)
Dept: OBGYN CLINIC | Facility: CLINIC | Age: 39
End: 2024-08-08
Payer: COMMERCIAL

## 2024-08-08 VITALS
SYSTOLIC BLOOD PRESSURE: 124 MMHG | BODY MASS INDEX: 23 KG/M2 | WEIGHT: 125 LBS | DIASTOLIC BLOOD PRESSURE: 62 MMHG | HEIGHT: 62 IN

## 2024-08-08 DIAGNOSIS — Z01.419 WOMEN'S ANNUAL ROUTINE GYNECOLOGICAL EXAMINATION: Primary | ICD-10-CM

## 2024-08-08 PROCEDURE — S0612 ANNUAL GYNECOLOGICAL EXAMINA: HCPCS | Performed by: OBSTETRICS & GYNECOLOGY

## 2024-08-08 RX ORDER — CETIRIZINE HYDROCHLORIDE 10 MG/1
10 TABLET, CHEWABLE ORAL DAILY
COMMUNITY

## 2024-08-08 NOTE — PROGRESS NOTES
"Daisha Rockwell is a 39 y.o. female who presents for annual well woman exam. Periods are regular every 28-30 days, lasting 5 days. No intermenstrual bleeding, spotting, or discharge.    Current contraception: none  History of abnormal Pap smear: no  Family history of uterine or ovarian cancer: no  Family history of breast cancer: no    Menstrual History:  OB History          2    Para   1    Term   1            AB        Living   1         SAB        IAB        Ectopic        Multiple   0    Live Births   1                  Patient's last menstrual period was 2024 (exact date).  Period Cycle (Days): 28  Period Duration (Days): 3  Period Pattern: Regular  Menstrual Flow: Heavy  Dysmenorrhea: (!) Moderate  Dysmenorrhea Symptoms: Cramping    The following portions of the patient's history were reviewed and updated as appropriate: allergies, current medications, past family history, past medical history, past social history, past surgical history, and problem list.    Review of Systems  Review of Systems   Constitutional:  Negative for activity change, appetite change, chills, fatigue and fever.   Respiratory:  Negative for apnea, cough, chest tightness and shortness of breath.    Cardiovascular:  Negative for chest pain, palpitations and leg swelling.   Gastrointestinal:  Negative for abdominal pain, constipation, diarrhea, nausea and vomiting.   Genitourinary:  Negative for difficulty urinating, dysuria, flank pain, frequency, hematuria and urgency.   Neurological:  Negative for dizziness, seizures, syncope, light-headedness, numbness and headaches.   Psychiatric/Behavioral:  Negative for agitation and confusion.           Objective      /62 (BP Location: Left arm, Patient Position: Sitting, Cuff Size: Adult)   Ht 5' 1.75\" (1.568 m)   Wt 56.7 kg (125 lb)   LMP 2024 (Exact Date)   BMI 23.05 kg/m²     Physical Exam  OBGyn Exam     General:   alert and oriented, in no " acute distress, alert, appears stated age, and cooperative   Heart: regular rate and rhythm, S1, S2 normal, no murmur, click, rub or gallop   Lungs: clear to auscultation bilaterally   Abdomen: soft, non-tender, without masses or organomegaly   Vulva: normal   Vagina: normal mucosa, normal discharge   Cervix: no cervical motion tenderness and no lesions   Uterus: normal size   Adnexa:  Breast Exam:  normal adnexa  breasts appear normal, no suspicious masses, no skin or nipple changes or axillary nodes.  Breast implant               Assessment      @[email or web address]@ .  39-year-old female  Annual exam  Natural planning contraception desire to conceive  Prior   Fibroid uterus with history of ovarian cyst  Plan   Pap/HPV -  Diet/exercise  Prenatal vitamin daily  Return to office for annual exam earlier with pregnancy   All questions answered.     There are no Patient Instructions on file for this visit.

## 2024-08-11 LAB
ALBUMIN SERPL-MCNC: 4.1 G/DL (ref 3.6–5.1)
ALBUMIN/GLOB SERPL: 1.6 (CALC) (ref 1–2.5)
ALP SERPL-CCNC: 51 U/L (ref 31–125)
ALT SERPL-CCNC: 15 U/L (ref 6–29)
AST SERPL-CCNC: 16 U/L (ref 10–30)
BASOPHILS # BLD AUTO: 18 CELLS/UL (ref 0–200)
BASOPHILS NFR BLD AUTO: 0.3 %
BILIRUB SERPL-MCNC: 0.5 MG/DL (ref 0.2–1.2)
BUN SERPL-MCNC: 8 MG/DL (ref 7–25)
BUN/CREAT SERPL: NORMAL (CALC) (ref 6–22)
CALCIUM SERPL-MCNC: 8.9 MG/DL (ref 8.6–10.2)
CHLORIDE SERPL-SCNC: 103 MMOL/L (ref 98–110)
CHOLEST SERPL-MCNC: 136 MG/DL
CHOLEST/HDLC SERPL: 3.1 (CALC)
CO2 SERPL-SCNC: 27 MMOL/L (ref 20–32)
CREAT SERPL-MCNC: 0.63 MG/DL (ref 0.5–0.97)
EOSINOPHIL # BLD AUTO: 49 CELLS/UL (ref 15–500)
EOSINOPHIL NFR BLD AUTO: 0.8 %
ERYTHROCYTE [DISTWIDTH] IN BLOOD BY AUTOMATED COUNT: 12.3 % (ref 11–15)
GFR/BSA.PRED SERPLBLD CYS-BASED-ARV: 116 ML/MIN/1.73M2
GLOBULIN SER CALC-MCNC: 2.6 G/DL (CALC) (ref 1.9–3.7)
GLUCOSE SERPL-MCNC: 85 MG/DL (ref 65–99)
HCT VFR BLD AUTO: 39.9 % (ref 35–45)
HDLC SERPL-MCNC: 44 MG/DL
HGB BLD-MCNC: 13.3 G/DL (ref 11.7–15.5)
LDLC SERPL CALC-MCNC: 74 MG/DL (CALC)
LYMPHOCYTES # BLD AUTO: 2294 CELLS/UL (ref 850–3900)
LYMPHOCYTES NFR BLD AUTO: 37.6 %
MCH RBC QN AUTO: 29.1 PG (ref 27–33)
MCHC RBC AUTO-ENTMCNC: 33.3 G/DL (ref 32–36)
MCV RBC AUTO: 87.3 FL (ref 80–100)
MONOCYTES # BLD AUTO: 561 CELLS/UL (ref 200–950)
MONOCYTES NFR BLD AUTO: 9.2 %
NEUTROPHILS # BLD AUTO: 3178 CELLS/UL (ref 1500–7800)
NEUTROPHILS NFR BLD AUTO: 52.1 %
NONHDLC SERPL-MCNC: 92 MG/DL (CALC)
PLATELET # BLD AUTO: 262 THOUSAND/UL (ref 140–400)
PMV BLD REES-ECKER: 10 FL (ref 7.5–12.5)
POTASSIUM SERPL-SCNC: 4.6 MMOL/L (ref 3.5–5.3)
PROT SERPL-MCNC: 6.7 G/DL (ref 6.1–8.1)
RBC # BLD AUTO: 4.57 MILLION/UL (ref 3.8–5.1)
SODIUM SERPL-SCNC: 138 MMOL/L (ref 135–146)
TRIGL SERPL-MCNC: 94 MG/DL
TSH SERPL-ACNC: 2.46 MIU/L
WBC # BLD AUTO: 6.1 THOUSAND/UL (ref 3.8–10.8)